# Patient Record
Sex: MALE | Race: WHITE | NOT HISPANIC OR LATINO | Employment: FULL TIME | URBAN - METROPOLITAN AREA
[De-identification: names, ages, dates, MRNs, and addresses within clinical notes are randomized per-mention and may not be internally consistent; named-entity substitution may affect disease eponyms.]

---

## 2017-07-19 ENCOUNTER — GENERIC CONVERSION - ENCOUNTER (OUTPATIENT)
Dept: OTHER | Facility: OTHER | Age: 37
End: 2017-07-19

## 2017-07-20 ENCOUNTER — GENERIC CONVERSION - ENCOUNTER (OUTPATIENT)
Dept: OTHER | Facility: OTHER | Age: 37
End: 2017-07-20

## 2017-07-20 LAB
A/G RATIO (HISTORICAL): 2.5 (ref 1.2–2.2)
ALBUMIN SERPL BCP-MCNC: 4.7 G/DL (ref 3.5–5.5)
ALP SERPL-CCNC: 74 IU/L (ref 39–117)
ALT SERPL W P-5'-P-CCNC: 19 IU/L (ref 0–44)
AST SERPL W P-5'-P-CCNC: 15 IU/L (ref 0–40)
BASOPHILS # BLD AUTO: 0 X10E3/UL (ref 0–0.2)
BASOPHILS # BLD AUTO: 1 %
BILIRUB SERPL-MCNC: 0.7 MG/DL (ref 0–1.2)
BUN SERPL-MCNC: 10 MG/DL (ref 6–20)
BUN/CREA RATIO (HISTORICAL): 14 (ref 9–20)
CALCIUM SERPL-MCNC: 9.4 MG/DL (ref 8.7–10.2)
CHLORIDE SERPL-SCNC: 98 MMOL/L (ref 96–106)
CHOLEST SERPL-MCNC: 150 MG/DL (ref 100–199)
CHOLEST/HDLC SERPL: 3.4 RATIO UNITS (ref 0–5)
CO2 SERPL-SCNC: 26 MMOL/L (ref 18–29)
CREAT SERPL-MCNC: 0.74 MG/DL (ref 0.76–1.27)
DEPRECATED RDW RBC AUTO: 12.6 % (ref 12.3–15.4)
EGFR AFRICAN AMERICAN (HISTORICAL): 137 ML/MIN/1.73
EGFR-AMERICAN CALC (HISTORICAL): 119 ML/MIN/1.73
EOSINOPHIL # BLD AUTO: 0.1 X10E3/UL (ref 0–0.4)
EOSINOPHIL # BLD AUTO: 3 %
GLUCOSE SERPL-MCNC: 99 MG/DL (ref 65–99)
HBA1C MFR BLD HPLC: 5.5 % (ref 4.8–5.6)
HCT VFR BLD AUTO: 42.2 % (ref 37.5–51)
HDLC SERPL-MCNC: 44 MG/DL
HGB BLD-MCNC: 14.7 G/DL (ref 12.6–17.7)
IMM.GRANULOCYTES (CD4/8) (HISTORICAL): 0 %
IMM.GRANULOCYTES (CD4/8) (HISTORICAL): 0 X10E3/UL (ref 0–0.1)
LDLC SERPL CALC-MCNC: 84 MG/DL (ref 0–99)
LYMPHOCYTES # BLD AUTO: 1.6 X10E3/UL (ref 0.7–3.1)
LYMPHOCYTES # BLD AUTO: 44 %
MCH RBC QN AUTO: 31.5 PG (ref 26.6–33)
MCHC RBC AUTO-ENTMCNC: 34.8 G/DL (ref 31.5–35.7)
MCV RBC AUTO: 90 FL (ref 79–97)
MONOCYTES # BLD AUTO: 0.3 X10E3/UL (ref 0.1–0.9)
MONOCYTES (HISTORICAL): 9 %
NEUTROPHILS # BLD AUTO: 1.6 X10E3/UL (ref 1.4–7)
NEUTROPHILS # BLD AUTO: 43 %
PLATELET # BLD AUTO: 263 X10E3/UL (ref 150–379)
POTASSIUM SERPL-SCNC: 4.7 MMOL/L (ref 3.5–5.2)
RBC (HISTORICAL): 4.67 X10E6/UL (ref 4.14–5.8)
SODIUM SERPL-SCNC: 138 MMOL/L (ref 134–144)
TOT. GLOBULIN, SERUM (HISTORICAL): 1.9 G/DL (ref 1.5–4.5)
TOTAL PROTEIN (HISTORICAL): 6.6 G/DL (ref 6–8.5)
TRIGL SERPL-MCNC: 112 MG/DL (ref 0–149)
VLDLC SERPL CALC-MCNC: 22 MG/DL (ref 5–40)
WBC # BLD AUTO: 3.7 X10E3/UL (ref 3.4–10.8)

## 2017-07-21 ENCOUNTER — ALLSCRIPTS OFFICE VISIT (OUTPATIENT)
Dept: OTHER | Facility: OTHER | Age: 37
End: 2017-07-21

## 2017-07-21 LAB
25(OH)D3 SERPL-MCNC: 24.7 NG/ML (ref 30–100)
INTERPRETATION (HISTORICAL): NORMAL
VIT B12 SERPL-MCNC: 340 PG/ML (ref 211–946)

## 2017-09-15 ENCOUNTER — GENERIC CONVERSION - ENCOUNTER (OUTPATIENT)
Dept: OTHER | Facility: OTHER | Age: 37
End: 2017-09-15

## 2017-10-05 ENCOUNTER — ALLSCRIPTS OFFICE VISIT (OUTPATIENT)
Dept: OTHER | Facility: OTHER | Age: 37
End: 2017-10-05

## 2017-10-05 DIAGNOSIS — J01.90 ACUTE SINUSITIS: ICD-10-CM

## 2017-10-06 NOTE — PROGRESS NOTES
Assessment  1  Headache (784 0) (R51)   2  Acute bacterial sinusitis (461 9) (J01 90,B96 89)    Plan  Acute bacterial sinusitis    · LevoFLOXacin 500 MG Oral Tablet (Levaquin); TAKE 1 TABLET DAILY   · SUMAtriptan Succinate 100 MG Oral Tablet (Imitrex); 1 tab PO as needed HA   · * XR SINUSES ROUTINE 3+ VIEWS; Status:Active; Requested OBX:[id number];     Discussion/Summary    Rto prn  Possible side effects of new medications were reviewed with the patient/guardian today  The treatment plan was reviewed with the patient/guardian  The patient/guardian understands and agrees with the treatment plan      Chief Complaint  Patient presents for c o headache and neck pain x 3 weeks  nil/lpn      History of Present Illness  HPI: 39 y o m seen for R sided HAs from neck to behind R eye x 3 wks, has some neck pain as well - chronic neck issues - feeling on and off congested - was Tx with Zpack 3 wks ago - not better, Levaquin was called in - did not fill      Review of Systems    Constitutional: feeling poorly, but-no fever  ENT: nasal discharge, but-no earache,-no sore throat-and-no hoarseness  Respiratory: no complaints of shortness of breath, no wheezing or cough, no dyspnea on exertion, no orthopnea or PND  Gastrointestinal: no complaints of abdominal pain, no constipation, no nausea or vomiting, no diarrhea or bloody stools  Musculoskeletal: as noted in HPI  Integumentary: no complaints of skin rash or lesion, no itching or dry skin, no skin wounds  Neurological: headache, but-as noted in HPI,-no numbness-and-no dizziness  Active Problems  1  Acute bacterial sinusitis (461 9) (J01 90,B96 89)   2  Allergic rhinitis (477 9) (J30 9)   3  Cervicalgia (723 1) (M54 2)   4  Headache (784 0) (R51)   5  Vitamin D deficiency (268 9) (E55 9)    Family History  Mother    1  Family history of hyperlipidemia (V18 19) (Z83 49)   2  Family history of hypertension (V17 49) (Z82 49)  Father    3   Family history of atrial fibrillation (V17 49) (Z82 49)  Grandmother    4  Family history of hypertension (V17 49) (Z82 49)  Maternal Grandmother    5  Family history of chronic obstructive pulmonary disease (V17 6) (Z82 5)   6  Family history of emphysema (V17 6) (Z82 5)   7  Family history of uveitis (V19 19) (Z83 518)    Social History   · Caffeine use (V49 89) (F15 90)   · Consumes a vegan diet (V49 89) (Z78 9)   · Drinks caffeinated tea   · Former smoker (Y85 37) (D81 952)   · Former smoker (V15 82) (B56 594)   · No alcohol use  The social history was reviewed and updated today  Surgical History  1  History of Hernia Repair    Current Meds   1  Probiotic Oral Capsule; Therapy: (Recorded:81Mee1998) to Recorded   2  Vitamin D 2000 UNIT Oral Capsule; take 1 capsule daily; Therapy: (Recorded:24Mdt4215) to Recorded    The medication list was reviewed and updated today  Allergies  1  No Known Drug Allergies  2  No Known Latex Allergies   3  Seasonal    Vitals   Recorded: [id number] 10:21AM   Temperature 98 3 F   Heart Rate 72   Pulse Quality Normal   Respiration Quality Normal   Respiration 16   Systolic 043   Diastolic 80   Height 5 ft 11 in   Weight 153 lb    BMI Calculated 21 34   BSA Calculated 1 88     Physical Exam    Constitutional   General appearance: No acute distress, well appearing and well nourished  acutely ill-and-comfortable  Ears, Nose, Mouth, and Throat   Otoscopic examination: Tympanic membrance translucent with normal light reflex  Canals patent without erythema  Oropharynx: Normal with no erythema, edema, exudate or lesions  Pulmonary   Respiratory effort: No increased work of breathing or signs of respiratory distress  Auscultation of lungs: Clear to auscultation, equal breath sounds bilaterally, no wheezes, no rales, no rhonci  Neurologic   Cranial nerves: Cranial nerves 2-12 intact           Signatures   Electronically signed by : АННА Carpio ; Oct  5 2017 12:44PM EST (Author)

## 2018-01-14 VITALS
BODY MASS INDEX: 21.42 KG/M2 | WEIGHT: 153 LBS | SYSTOLIC BLOOD PRESSURE: 120 MMHG | TEMPERATURE: 98.3 F | HEART RATE: 72 BPM | HEIGHT: 71 IN | RESPIRATION RATE: 16 BRPM | DIASTOLIC BLOOD PRESSURE: 80 MMHG

## 2018-01-17 NOTE — PROGRESS NOTES
Assessment    1  Encounter for preventive health examination (V70 0) (Z00 00)   2  Vitamin D deficiency (268 9) (E55 9)    Discussion/Summary    Rto prn   MVI with Vit D 1000 IU a day  The treatment plan was reviewed with the patient/guardian  The patient/guardian understands and agrees with the treatment plan      Chief Complaint  Patient presents for annual PE  nil/lpn      History of Present Illness  HM, Adult Male: The patient is being seen for a health maintenance evaluation  The last health maintenance visit was 2 year(s) ago  General Health: The patient's health since the last visit is described as good  He has regular dental visits  He complains of vision problems  Vision care includes wearing glasses, having regular eye examinations and an eye examination within the last year  Immunizations status: up to date  Lifestyle:  He consumes a diverse and healthy diet  He does not have any weight concerns  He exercises regularly  He does not use tobacco  The patient is a former cigarette smoker, quit smoking cigarettes: 5 y ago and has never used smokeless tobacco  Tobacco Use Duration: 20 cigarette pack(s) per day and 15 year(s) of cigarette use  He denies alcohol use  He denies drug use  Reproductive health:  the patient is sexually active  He is monogamous with a female partner  Screening:      Review of Systems    Constitutional: No fever or chills, feels well, no tiredness, no recent weight gain or weight loss  Eyes: No complaints of eye pain, no red eyes, no discharge from eyes, no itchy eyes  ENT: no complaints of earache, no hearing loss, no nosebleeds, no nasal discharge, no sore throat, no hoarseness  Cardiovascular: No complaints of slow heart rate, no fast heart rate, no chest pain, no palpitations, no leg claudication, no lower extremity  Respiratory: No complaints of shortness of breath, no wheezing, no cough, no SOB on exertion, no orthopnea or PND     Gastrointestinal: No complaints of abdominal pain, no constipation, no nausea or vomiting, no diarrhea or bloody stools  Genitourinary: No complaints of dysuria, no incontinence, no hesitancy, no nocturia, no genital lesion, no testicular pain  Musculoskeletal: No complaints of arthralgia, no myalgias, no joint swelling or stiffness, no limb pain or swelling  Integumentary: No complaints of skin rash or skin lesions, no itching, no skin wound, no dry skin  Neurological: No compliants of headache, no confusion, no convulsions, no numbness or tingling, no dizziness or fainting, no limb weakness, no difficulty walking  Psychiatric: Is not suicidal, no sleep disturbances, no anxiety or depression, no change in personality, no emotional problems  Endocrine: No complaints of proptosis, no hot flashes, no muscle weakness, no erectile dysfunction, no deepening of the voice, no feelings of weakness  Hematologic/Lymphatic: No complaints of swollen glands, no swollen glands in the neck, does not bleed easily, no easy bruising  Surgical History    · History of Hernia Repair    Family History  Mother    · Family history of hyperlipidemia (V18 19) (Z83 49)   · Family history of hypertension (V17 49) (Z82 49)  Father    · Family history of atrial fibrillation (V17 49) (Z82 49)  Grandmother    · Family history of hypertension (V17 49) (Z82 49)  Maternal Grandmother    · Family history of chronic obstructive pulmonary disease (V17 6) (Z82 5)   · Family history of emphysema (V17 6) (Z82 5)   · Family history of uveitis (V19 19) (Z83 518)    Social History    · Caffeine use (V49 89) (F15 90)   · Consumes a vegan diet (V49 89) (Z78 9)   · Drinks caffeinated tea   · Former smoker (V15 82) (W34 891)   · Former smoker (V15 82) (A85 891)   · No alcohol use    Current Meds   1  No Reported Medications Recorded   2  No Reported Medications Recorded    Allergies    1  No Known Drug Allergies    2  No Known Latex Allergies   3  Seasonal    Vitals   Recorded: 82Awc2294 02:35PM   Temperature 98 6 F   Heart Rate 76   Respiration Quality Normal   Respiration 20   Systolic 466   Diastolic 70   Height 5 ft 11 in   Weight 152 lb    BMI Calculated 21 2   BSA Calculated 1 88     Physical Exam    Constitutional   General appearance: No acute distress, well appearing and well nourished  Head and Face   Head and face: Normal     Eyes   Conjunctiva and lids: No erythema, swelling or discharge  Pupils and irises: Equal, round, reactive to light  Ears, Nose, Mouth, and Throat   Otoscopic examination: Tympanic membranes translucent with normal light reflex  Canals patent without erythema  Oropharynx: Normal with no erythema, edema, exudate or lesions  Neck   Neck: Supple, symmetric, trachea midline, no masses  Thyroid: Normal, no thyromegaly  Pulmonary   Respiratory effort: No increased work of breathing or signs of respiratory distress  Auscultation of lungs: Clear to auscultation  Cardiovascular   Auscultation of heart: Normal rate and rhythm, normal S1 and S2, no murmurs  Examination of extremities for edema and/or varicosities: Normal     Abdomen   Abdomen: Non-tender, no masses  Lymphatic   Palpation of lymph nodes in neck: No lymphadenopathy  Musculoskeletal   Gait and station: Normal     Inspection/palpation of joints, bones, and muscles: Normal     Muscle strength/tone: Normal     Skin   Skin and subcutaneous tissue: Normal without rashes or lesions  Neurologic   Cranial nerves: Cranial nerves 2-12 intact  Coordination: Normal finger to nose and heel to shin      Psychiatric   Judgment and insight: Normal     Mood and affect: Normal        Results/Data  (1) CBC/PLT/DIFF [id number] 07:08AM Usman Stade     Test Name Result Flag Reference   WBC 3 7 x10E3/uL  3 4-10 8   RBC 4 67 x10E6/uL  4 14-5 80   Hemoglobin 14 7 g/dL  12 6-17 7   Hematocrit 42 2 %  37 5-51 0   MCV 90 fL  79-97   MCH 31 5 pg 26 6-33 0   MCHC 34 8 g/dL  31 5-35 7   RDW 12 6 %  12 3-15 4   Platelets 686 W72P3/SD  150-379   Neutrophils 43 %     Lymphs 44 %     Monocytes 9 %     Eos 3 %     Basos 1 %     Neutrophils (Absolute) 1 6 x10E3/uL  1 4-7 0   Lymphs (Absolute) 1 6 x10E3/uL  0 7-3 1   Monocytes(Absolute) 0 3 x10E3/uL  0 1-0 9   Eos (Absolute) 0 1 x10E3/uL  0 0-0 4   Baso (Absolute) 0 0 x10E3/uL  0 0-0 2   Immature Granulocytes 0 %     Immature Grans (Abs) 0 0 x10E3/uL  0 0-0 1     (1) VITAMIN D 25-HYDROXY [id number] 07:08AM Rian Zuñiga     Test Name Result Flag Reference   Vitamin D, 25-Hydroxy 24 7 ng/mL L 30 0-100 0   Vitamin D deficiency has been defined by the [facility] [address] practice guideline as a  level of serum 25-OH vitamin D less than 20 ng/mL (1,2)  The Endocrine Society went on to further define vitamin D  insufficiency as a level between 21 and 29 ng/mL (2)  1  IOM ([place] [employer])  2010  Dietary reference     intakes for calcium and D  [address] [facility]: The     [employer]  2  Irwin MF, Axel ACUÑA, Christ SALMON, et al      Evaluation, treatment, and prevention of vitamin D     deficiency: an Endocrine Society clinical practice     guideline  JCEM  2011 Jul; 96(7):1911-30       (1) COMPREHENSIVE METABOLIC PANEL [id number] 75:78DD Rian Zuñiga     Test Name Result Flag Reference   Glucose, Serum 99 mg/dL  65-99   BUN 10 mg/dL  6-20   Creatinine, Serum 0 74 mg/dL L 0 76-1 27   BUN/Creatinine Ratio 14  9-20   Sodium, Serum 138 mmol/L  134-144   Potassium, Serum 4 7 mmol/L  3 5-5 2   Chloride, Serum 98 mmol/L     Carbon Dioxide, Total 26 mmol/L  18-29   Calcium, Serum 9 4 mg/dL  8 7-10 2   Protein, Total, Serum 6 6 g/dL  6 0-8 5   Albumin, Serum 4 7 g/dL  3 5-5 5   Globulin, Total 1 9 g/dL  1 5-4 5   A/G Ratio 2 5 H 1 2-2 2   Bilirubin, Total 0 7 mg/dL  0 0-1 2   Alkaline Phosphatase, S 74 IU/L     AST (SGOT) 15 IU/L  0-40   ALT (SGPT) 19 IU/L  0-44   eGFR If NonAfricn Am 119 mL/min/1 73  >59   eGFR If Africn Am 137 mL/min/1 73  >59     (1) LIPID PANEL, FASTING [id number] 07:08AM [employer] [facility]     Test Name Result Flag Reference   Cholesterol, Total 150 mg/dL  100-199   Triglycerides 112 mg/dL  0-149   HDL Cholesterol 44 mg/dL  >39   VLDL Cholesterol Toribio 22 mg/dL  5-40   LDL Cholesterol Calc 84 mg/dL  0-99   T  Chol/HDL Ratio 3 4 ratio units  0 0-5 0   T  Chol/HDL Ratio                                                             Men  Women                                               1/2 Avg  Risk  3 4    3 3                                                   Avg Risk  5 0    4 4                                                2X Avg  Risk  9 6    7 1                                                3X Avg  Risk 23 4   11 0     (1) HEMOGLOBIN A1C [id number] 07:08AM [facility]leene Prescott     Test Name Result Flag Reference   Hemoglobin A1c 5 5 %  4 8-5 6   Pre-diabetes: 5 7 - 6 4           Diabetes: >6 4           Glycemic control for adults with diabetes: <7 0     (1) VITAMIN B12 [id number] 07:08AM [facility] [employer]     Test Name Result Flag Reference   Vitamin B12 340 pg/mL  211946       Procedure    Procedure: Visual Acuity Test    Indication: routine screening  Inforrmation supplied by a Snellen chart  Results: 20/15 in both eyes with corrective device, 20/25 in the right eye with corrective device, 20/20 in the left eye with corrective device   Color vision was screened with the Ishihara Test and the results were normal    The patient was cooperative, but tolerated the procedure well        Signatures   Electronically signed by : АННА John ; Jul 21 2017  3:15PM EST                       (Author)

## 2018-01-22 VITALS
DIASTOLIC BLOOD PRESSURE: 65 MMHG | RESPIRATION RATE: 16 BRPM | WEIGHT: 153.13 LBS | SYSTOLIC BLOOD PRESSURE: 100 MMHG | TEMPERATURE: 98.2 F | HEIGHT: 71 IN | BODY MASS INDEX: 21.44 KG/M2 | HEART RATE: 68 BPM

## 2018-01-22 VITALS
DIASTOLIC BLOOD PRESSURE: 70 MMHG | HEIGHT: 71 IN | WEIGHT: 152 LBS | TEMPERATURE: 98.6 F | BODY MASS INDEX: 21.28 KG/M2 | HEART RATE: 76 BPM | RESPIRATION RATE: 20 BRPM | SYSTOLIC BLOOD PRESSURE: 110 MMHG

## 2018-07-18 DIAGNOSIS — Z13.1 SCREENING FOR DIABETES MELLITUS: ICD-10-CM

## 2018-07-18 DIAGNOSIS — Z13.6 SCREENING FOR CARDIOVASCULAR CONDITION: ICD-10-CM

## 2018-07-18 DIAGNOSIS — E55.9 VITAMIN D DEFICIENCY: Primary | ICD-10-CM

## 2018-07-18 DIAGNOSIS — Z78.9 VEGAN: ICD-10-CM

## 2018-07-18 RX ORDER — MULTIVIT-MIN/IRON/FOLIC ACID/K 18-600-40
1 CAPSULE ORAL DAILY
COMMUNITY

## 2018-07-19 DIAGNOSIS — Z13.6 SCREENING FOR CARDIOVASCULAR CONDITION: ICD-10-CM

## 2018-07-19 DIAGNOSIS — Z13.1 SCREENING FOR DIABETES MELLITUS: ICD-10-CM

## 2018-07-19 DIAGNOSIS — Z78.9 VEGAN: Primary | ICD-10-CM

## 2018-08-06 VITALS
WEIGHT: 150 LBS | DIASTOLIC BLOOD PRESSURE: 86 MMHG | SYSTOLIC BLOOD PRESSURE: 129 MMHG | HEART RATE: 61 BPM | BODY MASS INDEX: 21 KG/M2 | HEIGHT: 71 IN

## 2018-08-06 DIAGNOSIS — M76.52 PATELLAR TENDINITIS, LEFT KNEE: Primary | ICD-10-CM

## 2018-08-06 DIAGNOSIS — IMO0001: ICD-10-CM

## 2018-08-06 DIAGNOSIS — M25.562 LEFT KNEE PAIN, UNSPECIFIED CHRONICITY: ICD-10-CM

## 2018-08-06 PROCEDURE — 99203 OFFICE O/P NEW LOW 30 MIN: CPT | Performed by: ORTHOPAEDIC SURGERY

## 2018-08-06 NOTE — PROGRESS NOTES
Assessment/Plan:  1  Patellar tendinitis, left knee     2  Sprain, hamstring, left, initial encounter     3  Left knee pain, unspecified chronicity  XR knee 3 vw left non injury       Scribe Attestation    I,:   Dennehotso Jeremy am acting as a scribe while in the presence of the attending physician :        I,:   Sofia Dover MD personally performed the services described in this documentation    as scribed in my presence :              Lucero Cortes upon examination of his left knee today demonstrates signs and symptoms consistent with a mild hamstring strain, as well as patellar tendinitis  I did discuss with him the that these injuries are commonly associated to each other, and can present at the same time  I would like to treat this conservatively  As he does demonstrate good strength with knee flexion as well as knee extension  I have given Nette 3 home exercises for his hamstrings as well as his quads to strengthen his knee  I discouraged excessive use of pain relievers encouraged the use of ice at night to help with any pain or swelling  This is to be for 15 minutes for each hour as tolerated  I feel that he may continue with activities as tolerated with no restrictions  Lucero Cortes may follow up with me on as-needed basis  Subjective:   Som Jeffery is a 40 y o  male who presents to me today with left posterior anterior knee pain  He states approximately 2 weeks ago he started experience intermittent and mild to moderate sharp pains about the posterior medial and anterior aspect of his knees  He states that he was doing dynamic like swings to stretch his hamstrings  He denies any pain or discomfort at the time of the activity  However did note soreness the following day  He is typically pain-free at rest however pain is exacerbated with activities such as hamstring stretching or running activities  He denies any history of injury to this knee  Today he denies any distal paresthesias        Review of Systems   Constitutional: Negative  HENT: Negative  Eyes: Negative  Respiratory: Negative  Cardiovascular: Negative  Gastrointestinal: Negative  Endocrine: Negative  Genitourinary: Negative  Musculoskeletal: Positive for arthralgias and joint swelling  Skin: Negative  Allergic/Immunologic: Negative  Neurological: Negative  Hematological: Negative  Psychiatric/Behavioral: Negative  Past Medical History:   Diagnosis Date    Cervical disc herniation     6/2/2014 per [employer]       Past Surgical History:   Procedure Laterality Date    HERNIA REPAIR         Family History   Problem Relation Age of Onset    Hyperlipidemia Mother     Hypertension Mother     Atrial fibrillation Father     COPD Maternal Grandmother     Emphysema Maternal Grandmother     Uveitis Maternal Grandmother        Social History     Occupational History    Not on file  Social History Main Topics    Smoking status: Former Smoker    Smokeless tobacco: Never Used    Alcohol use No    Drug use: No    Sexual activity: Not on file         Current Outpatient Prescriptions:     Cholecalciferol (VITAMIN D) 2000 units CAPS, Take 1 capsule by mouth daily, Disp: , Rfl:     No Known Allergies    Objective:  Vitals:    08/06/18 0917   BP: 129/86   Pulse: 61       Left Knee Exam     Tenderness   The patient is experiencing tenderness in the patellar tendon (Medial border of the patella)  Range of Motion   Extension: 0 (65° hip flexion with knee fully extended)   Flexion: 150     Tests   Roly:  Medial - negative Lateral - negative  Drawer:       Anterior - negative     Posterior - negative  Varus: negative  Valgus: negative    Other   Erythema: absent  Scars: absent  Sensation: normal  Pulse: present  Left knee swelling: Swelling noted the patellar tendon      Comments:  Knee extension strength:  5/5  Knee flexion strength:  5/5            Physical Exam   Constitutional: He is oriented to person, place, and time  He appears well-developed and well-nourished  HENT:   Head: Normocephalic and atraumatic  Eyes: Conjunctivae are normal    Neck: Normal range of motion  Cardiovascular: Normal rate  Pulmonary/Chest: Effort normal    Musculoskeletal:   As noted in HPI   Neurological: He is alert and oriented to person, place, and time  Skin: Skin is warm and dry  Psychiatric: He has a normal mood and affect  His behavior is normal  Judgment and thought content normal    Nursing note and vitals reviewed

## 2018-08-06 NOTE — PATIENT INSTRUCTIONS
Knee Exercises   AMBULATORY CARE:   What you need to know about knee exercises:  Knee exercises help strengthen the muscles around your knee  Strong muscles can help reduce pain and decrease your risk of future injury  Knee exercises also help you heal after an injury or surgery  · Start slow  These are beginning exercises  Ask your healthcare provider if you need to see a physical therapist for more advanced exercises  As you get stronger, you may be able to do more sets of each exercise or add weights  · Stop if you feel pain  It is normal to feel some discomfort at first  Regular exercise will help decrease your discomfort over time  · Do the exercises on both legs  Do this so both knees remain strong  · Warm up before you do knee exercises  Walk or ride a stationary bike for 5 or 10 minutes to warm your muscles  How to perform knee stretches safely:  Always stretch before you do strengthening exercises  Do these stretching exercises again after you do the strengthening exercises  Do these stretches 4 or 5 days a week, or as directed  · Standing calf stretch: Face a wall and place both palms flat on the wall, or hold the back of a chair for balance  Keep a slight bend in your knees  Take a big step backward with one leg  Keep your other leg directly under you  Keep both heels flat and press your hips forward  Hold the stretch for 30 seconds, and then relax for 30 seconds  Switch legs  Repeat 2 or 3 times on each leg  · Standing quadriceps stretch:  Stand and place one hand against a wall or hold the back of a chair for balance  With your weight on one leg, bend your other leg and grab your ankle  Bring your heel toward your buttocks  Hold the stretch for 30 to 60 seconds  Switch legs  Repeat 2 or 3 times on each leg  · Sitting hamstring stretch:  Sit with both legs straight in front of you  Do not point or flex your toes   Place your palms on the floor and slide your hands forward until you feel the stretch  Do not round your back  Hold the stretch for 30 seconds  Repeat 2 or 3 times  How to perform knee strengthening exercises safely:  Do these exercises 4 or 5 days a week, or as directed  · Standing half squats:  Stand with your feet shoulder-width apart  Lean your back against a wall or hold the back of a chair for balance, if needed  Slowly sit down about 10 inches, as if you are going to sit in a chair  Your body weight should be mostly over your heels  Hold the squat for 5 seconds, then rise to a standing position  Do 3 sets of 10 squats to strengthen your buttocks and thighs  · Standing hamstring curls: Face a wall and place both palms flat on the wall, or hold the back of a chair for balance  With your weight on one leg, lift your other foot as close to your buttocks as you can  Hold for 5 seconds and then lower your leg  Do 2 sets of 10 curls on each leg  This exercise strengthens the muscles in the back of your thigh  · Standing calf raises:  Face a wall and place both palms flat on the wall, or hold the back of a chair for balance  Stand up straight, and do not lean  Place all your weight on one leg by lifting the other foot off the floor  Raise the heel of the foot that is on the floor as high as you can and then lower it  Do 2 sets of 10 calf raises on each leg to strengthen your calf muscles  · Straight leg lifts:  Lie on your stomach with straight legs  Fold your arms in front of you and rest your head in your arms  Tighten your leg muscles and raise one leg as high as you can  Hold for 5 seconds, then lower your leg  Do 2 sets of 10 lifts on each leg to strengthen your buttocks  · Sitting leg lifts:  Sit in a chair  Slowly straighten and raise one leg  Squeeze your thigh muscles and hold for 5 seconds  Relax and return your foot to the floor  Do 2 sets of 10 lifts on each leg   This helps strengthen the muscles in the front of your thigh  Contact your healthcare provider if:   · You have new pain or your pain becomes worse  · You have questions or concerns about your condition or care  © 2017 [employer] Information is for End User's use only and may not be sold, redistributed or otherwise used for commercial purposes  All illustrations and images included in CareNotes® are the copyrighted property of [employer]  or [employer]  The above information is an  only  It is not intended as medical advice for individual conditions or treatments  Talk to your doctor, nurse or pharmacist before following any medical regimen to see if it is safe and effective for you  Hamstring Exercises   AMBULATORY CARE:   Hamstring exercises  help strengthen and stretch the muscles that support your lower back, hips, and knee  This decreases pain, improves movement, and decreases your risk of future injury  Contact your healthcare provider if:   · You have sharp or worsening pain during exercise or at rest     · You have questions or concern about your condition, care, or exercise program   Exercise safely:   · Move slowly and smoothly  Avoid fast or jerky motions  This will help prevent another injury  · Breathe normally  Do not hold your breath  It is important to breathe in and out so you do not tense up during exercise  Tension could prevent your muscles from stretching  · Do the exercises and stretches on both legs  Do this so the muscles on both legs remain strong and flexible  · Stop if you feel sharp pain or an increase in pain  Stop the exercise and contact your healthcare provider if you have these symptoms  It is normal to feel some discomfort, such as a dull ache, during exercise  Regular exercise will help decrease your discomfort over time  · Warm up before you stretch and exercise  This will help prevent an injury   Walk or ride a stationary bike for 5 to 10 minutes  How to perform stretching exercises:  Ask your healthcare provider how often to do these stretches:  · Hamstring stretch with a towel:  Lie on your back on the floor  Bend both legs so your feet rest flat  Lift one leg off the floor and loop a towel around your foot  Grasp the ends of the towel and slowly straighten your lifted leg  Use the towel to gently pull your leg toward you until you feel the stretch  Keep your leg straight and your foot flexed toward your body  Hold for 30 seconds  Use a longer towel if needed  · Sitting hamstring stretch:  Sit on the floor with both legs straight in front of you  Do not point your toes or flex your feet  Place your palms on the floor and slide your hands forward until you feel the stretch  Keep your back straight and do not lock your knees  Hold the stretch for 30 seconds  · Standing hamstring stretch:  Stand with your feet hips distance apart  Place one leg so it rests on a firm surface, such as a table or chair  Keep your toes pointing up  Slide both hands down the outside of your leg until you feel a stretch  Keep your chest lifted and your back straight  Hold for 30 seconds  · Sitting wide-leg stretch:  Sit on the floor and extend your legs as wide as possible  Keep your legs straight and lean over one leg  Slide your hands forward until you feel a stretch  Keep your chest lifted and your back straight  Hold for 30 seconds  How to perform strengthening exercises:  Always do strengthening exercises after you stretch  As you get stronger your healthcare provider may tell you to you add weights or more repetitions to your strengthening exercises  · Hamstring curls:  Place your hand on a wall or the back of a chair for balance  Place the weight in one of your legs  Lift the other leg and raise your heel toward your buttocks  Hold for 5 seconds  Slowly lower your leg until it is a few inches off the floor  Do 3 sets of 10   Repeat on other side  · Straight leg raise:  Lie on the floor with your face down  Rest your forehead on your folded arms  Keep your body in a straight line  Keep your hip bones on the floor, and tighten the butt and thigh muscles of your injured leg  Keep one leg straight and raise it toward the ceiling as high as you can  Hold for 5 seconds  Slowly return to the starting position  Do 3 sets of 10  Repeat on other side  · Half squats:  Stand with your feet shoulder distance apart  Rest your hands on the front of your thighs or reach them out in front of you  You may hold on to the back of a chair or wall for balance  Keep your chest lifted and lower your hips about 10 inches, as if you are going to sit  Make sure your weight is in your heels and hold for 5 seconds  Keep your weight in your heels and slowly stand  Do 3 sets of 10  Follow up with your healthcare provider as directed:  Write down your questions so you remember to ask them during your visits  © 2017 [employer] Information is for End User's use only and may not be sold, redistributed or otherwise used for commercial purposes  All illustrations and images included in CareNotes® are the copyrighted property of [employer]  or [employer]  The above information is an  only  It is not intended as medical advice for individual conditions or treatments  Talk to your doctor, nurse or pharmacist before following any medical regimen to see if it is safe and effective for you

## 2018-08-08 ENCOUNTER — OFFICE VISIT (OUTPATIENT)
Dept: FAMILY MEDICINE CLINIC | Facility: CLINIC | Age: 38
End: 2018-08-08
Payer: COMMERCIAL

## 2018-08-08 VITALS
HEIGHT: 71 IN | WEIGHT: 151.2 LBS | TEMPERATURE: 98.8 F | RESPIRATION RATE: 16 BRPM | HEART RATE: 84 BPM | BODY MASS INDEX: 21.17 KG/M2 | DIASTOLIC BLOOD PRESSURE: 60 MMHG | SYSTOLIC BLOOD PRESSURE: 110 MMHG

## 2018-08-08 DIAGNOSIS — M25.562 PAIN AND SWELLING OF KNEE, LEFT: ICD-10-CM

## 2018-08-08 DIAGNOSIS — M25.462 PAIN AND SWELLING OF KNEE, LEFT: ICD-10-CM

## 2018-08-08 DIAGNOSIS — Z00.00 ROUTINE MEDICAL EXAM: Primary | ICD-10-CM

## 2018-08-08 PROCEDURE — 99395 PREV VISIT EST AGE 18-39: CPT | Performed by: FAMILY MEDICINE

## 2018-08-08 NOTE — PROGRESS NOTES
Chief Complaint   Patient presents with    Physical Exam        Patient ID: Preeti Chandler is a 40 y o  male  HPI  Pt is seeing for CPE     The following portions of the patient's history were reviewed and updated as appropriate: allergies, current medications, past family history, past medical history, past social history, past surgical history and problem list     Review of Systems   Constitutional: Negative for fatigue, fever and unexpected weight change  HENT: Negative for congestion, ear discharge, ear pain, hearing loss, rhinorrhea, sinus pressure, sore throat and trouble swallowing  Eyes: Negative  Respiratory: Negative  Cardiovascular: Negative  Gastrointestinal: Negative  Endocrine: Negative  Genitourinary: Negative  Musculoskeletal: Positive for arthralgias  Negative for back pain, gait problem, myalgias, neck pain and neck stiffness  L knee swelling and pain x 2 wks    Skin: Negative  Neurological: Negative for dizziness, weakness, light-headedness and numbness  Hematological: Negative  Psychiatric/Behavioral: Negative  Current Outpatient Prescriptions   Medication Sig Dispense Refill    Cholecalciferol (VITAMIN D) 2000 units CAPS Take 1 capsule by mouth daily       No current facility-administered medications for this visit  Objective:    /60 (BP Location: Right arm, Patient Position: Sitting, Cuff Size: Standard)   Pulse 84   Temp 98 8 °F (37 1 °C) (Tympanic)   Resp 16   Ht 5' 11" (1 803 m)   Wt 68 6 kg (151 lb 3 2 oz)   BMI 21 09 kg/m²        Physical Exam   Constitutional: He is oriented to person, place, and time  He appears well-developed and well-nourished  No distress  HENT:   Head: Normocephalic and atraumatic     Right Ear: Hearing, tympanic membrane, external ear and ear canal normal    Left Ear: Hearing, tympanic membrane, external ear and ear canal normal    Mouth/Throat: Oropharynx is clear and moist    Eyes: Conjunctivae and EOM are normal    Neck: Neck supple  No JVD present  No thyroid mass and no thyromegaly present  Cardiovascular: Regular rhythm and normal heart sounds  Exam reveals no gallop  No murmur heard  Pulmonary/Chest: Breath sounds normal  No respiratory distress  He has no wheezes  He has no rhonchi  He has no rales  Abdominal: Soft  Bowel sounds are normal  There is no tenderness  Musculoskeletal: He exhibits no tenderness or deformity  Left knee: He exhibits swelling  He exhibits normal range of motion, no effusion and no erythema  Lymphadenopathy:     He has no cervical adenopathy  Neurological: He is alert and oriented to person, place, and time  He has normal strength  No cranial nerve deficit  Skin: Skin is intact  No rash noted  No pallor  Psychiatric: He has a normal mood and affect  His behavior is normal          Labs in chart were reviewed  Assessment/Plan:         Diagnoses and all orders for this visit:    Routine medical exam    Pain and swelling of knee, left  -     Lyme Antibody Profile with reflex to WB;  Future      rto prdavid Hayes MD

## 2018-08-10 LAB
25(OH)D3 SERPL-MCNC: 31 NG/ML
ALBUMIN SERPL-MCNC: 4.7 G/DL (ref 3.5–5.5)
ALBUMIN/GLOB SERPL: 2.4 {RATIO} (ref 1.2–2.2)
ALP SERPL-CCNC: 80 IU/L (ref 39–117)
ALT SERPL-CCNC: 14 IU/L (ref 0–44)
AST SERPL-CCNC: 13 IU/L (ref 0–40)
BILIRUB SERPL-MCNC: 0.6 MG/DL (ref 0–1.2)
BUN SERPL-MCNC: 8 MG/DL (ref 6–20)
BUN/CREAT SERPL: 9 (ref 9–20)
CALCIUM SERPL-MCNC: 9.6 MG/DL (ref 8.7–10.2)
CHLORIDE SERPL-SCNC: 102 MMOL/L (ref 96–106)
CHOLEST SERPL-MCNC: 148 MG/DL (ref 100–199)
CHOLEST/HDLC SERPL: 3.3 RATIO (ref 0–5)
CO2 SERPL-SCNC: 24 MMOL/L (ref 20–29)
CREAT SERPL-MCNC: 0.85 MG/DL (ref 0.76–1.27)
ERYTHROCYTE [DISTWIDTH] IN BLOOD BY AUTOMATED COUNT: 12.6 % (ref 12.3–15.4)
EST. AVERAGE GLUCOSE BLD GHB EST-MCNC: 103 MG/DL
GLOBULIN SER-MCNC: 2 G/DL (ref 1.5–4.5)
GLUCOSE SERPL-MCNC: 102 MG/DL (ref 65–99)
HBA1C MFR BLD: 5.2 % (ref 4.8–5.6)
HCT VFR BLD AUTO: 41.2 % (ref 37.5–51)
HDLC SERPL-MCNC: 45 MG/DL
HGB BLD-MCNC: 14.3 G/DL (ref 13–17.7)
LDLC SERPL CALC-MCNC: 84 MG/DL (ref 0–99)
MCH RBC QN AUTO: 31.9 PG (ref 26.6–33)
MCHC RBC AUTO-ENTMCNC: 34.7 G/DL (ref 31.5–35.7)
MCV RBC AUTO: 92 FL (ref 79–97)
MICRODELETION SYND BLD/T FISH: NORMAL
PLATELET # BLD AUTO: 264 X10E3/UL (ref 150–379)
POTASSIUM SERPL-SCNC: 4.3 MMOL/L (ref 3.5–5.2)
PROT SERPL-MCNC: 6.7 G/DL (ref 6–8.5)
RBC # BLD AUTO: 4.48 X10E6/UL (ref 4.14–5.8)
SL AMB EGFR AFRICAN AMERICAN: 129 ML/MIN/1.73
SL AMB EGFR NON AFRICAN AMERICAN: 111 ML/MIN/1.73
SL AMB VLDL CHOLESTEROL CALC: 19 MG/DL (ref 5–40)
SODIUM SERPL-SCNC: 143 MMOL/L (ref 134–144)
TRIGL SERPL-MCNC: 97 MG/DL (ref 0–149)
VIT B12 SERPL-MCNC: 262 PG/ML (ref 232–1245)
WBC # BLD AUTO: 4.1 X10E3/UL (ref 3.4–10.8)

## 2018-08-16 LAB
B BURGDOR IGG+IGM SER-ACNC: <0.91 ISR (ref 0–0.9)
LABCORP COMMENT: NORMAL

## 2019-05-22 ENCOUNTER — OFFICE VISIT (OUTPATIENT)
Dept: RHEUMATOLOGY | Facility: CLINIC | Age: 39
End: 2019-05-22
Payer: COMMERCIAL

## 2019-05-22 ENCOUNTER — APPOINTMENT (OUTPATIENT)
Dept: RADIOLOGY | Facility: CLINIC | Age: 39
End: 2019-05-22
Payer: COMMERCIAL

## 2019-05-22 VITALS
SYSTOLIC BLOOD PRESSURE: 113 MMHG | WEIGHT: 154 LBS | DIASTOLIC BLOOD PRESSURE: 79 MMHG | BODY MASS INDEX: 21.56 KG/M2 | HEIGHT: 71 IN | HEART RATE: 78 BPM

## 2019-05-22 DIAGNOSIS — M79.642 BILATERAL HAND PAIN: ICD-10-CM

## 2019-05-22 DIAGNOSIS — R21 SKIN RASH: ICD-10-CM

## 2019-05-22 DIAGNOSIS — G89.29 CHRONIC PAIN OF BOTH KNEES: ICD-10-CM

## 2019-05-22 DIAGNOSIS — M54.50 CHRONIC MIDLINE LOW BACK PAIN WITHOUT SCIATICA: ICD-10-CM

## 2019-05-22 DIAGNOSIS — M79.641 BILATERAL HAND PAIN: ICD-10-CM

## 2019-05-22 DIAGNOSIS — M25.561 CHRONIC PAIN OF BOTH KNEES: ICD-10-CM

## 2019-05-22 DIAGNOSIS — G89.29 CHRONIC MIDLINE LOW BACK PAIN WITHOUT SCIATICA: ICD-10-CM

## 2019-05-22 DIAGNOSIS — M25.562 CHRONIC PAIN OF BOTH KNEES: Primary | ICD-10-CM

## 2019-05-22 DIAGNOSIS — G89.29 CHRONIC PAIN OF BOTH KNEES: Primary | ICD-10-CM

## 2019-05-22 DIAGNOSIS — M25.561 CHRONIC PAIN OF BOTH KNEES: Primary | ICD-10-CM

## 2019-05-22 DIAGNOSIS — M25.562 CHRONIC PAIN OF BOTH KNEES: ICD-10-CM

## 2019-05-22 PROCEDURE — 73130 X-RAY EXAM OF HAND: CPT

## 2019-05-22 PROCEDURE — 73562 X-RAY EXAM OF KNEE 3: CPT

## 2019-05-22 PROCEDURE — 72202 X-RAY EXAM SI JOINTS 3/> VWS: CPT

## 2019-05-22 PROCEDURE — 99204 OFFICE O/P NEW MOD 45 MIN: CPT | Performed by: INTERNAL MEDICINE

## 2019-05-22 RX ORDER — DIPHENOXYLATE HYDROCHLORIDE AND ATROPINE SULFATE 2.5; .025 MG/1; MG/1
1 TABLET ORAL DAILY
COMMUNITY

## 2019-05-31 ENCOUNTER — TELEPHONE (OUTPATIENT)
Dept: OBGYN CLINIC | Facility: CLINIC | Age: 39
End: 2019-05-31

## 2019-05-31 LAB
ALBUMIN SERPL-MCNC: 4.8 G/DL (ref 3.5–5.5)
ALBUMIN/GLOB SERPL: 2.5 {RATIO} (ref 1.2–2.2)
ALP SERPL-CCNC: 66 IU/L (ref 39–117)
ALT SERPL-CCNC: 14 IU/L (ref 0–44)
AST SERPL-CCNC: 12 IU/L (ref 0–40)
BASOPHILS # BLD AUTO: 0 X10E3/UL (ref 0–0.2)
BASOPHILS NFR BLD AUTO: 1 %
BILIRUB SERPL-MCNC: 0.7 MG/DL (ref 0–1.2)
BUN SERPL-MCNC: 8 MG/DL (ref 6–20)
BUN/CREAT SERPL: 11 (ref 9–20)
CALCIUM SERPL-MCNC: 9.6 MG/DL (ref 8.7–10.2)
CCP IGA+IGG SERPL IA-ACNC: 3 UNITS (ref 0–19)
CHLORIDE SERPL-SCNC: 101 MMOL/L (ref 96–106)
CO2 SERPL-SCNC: 24 MMOL/L (ref 20–29)
CREAT SERPL-MCNC: 0.74 MG/DL (ref 0.76–1.27)
CRP SERPL-MCNC: <0.3 MG/L (ref 0–4.9)
EOSINOPHIL # BLD AUTO: 0 X10E3/UL (ref 0–0.4)
EOSINOPHIL NFR BLD AUTO: 1 %
ERYTHROCYTE [DISTWIDTH] IN BLOOD BY AUTOMATED COUNT: 12.2 % (ref 12.3–15.4)
ERYTHROCYTE [SEDIMENTATION RATE] IN BLOOD BY WESTERGREN METHOD: 2 MM/HR (ref 0–15)
GLOBULIN SER-MCNC: 1.9 G/DL (ref 1.5–4.5)
GLUCOSE SERPL-MCNC: 90 MG/DL (ref 65–99)
HAV IGM SERPL QL IA: NEGATIVE
HBV CORE IGM SERPL QL IA: NEGATIVE
HBV SURFACE AG SERPL QL IA: NEGATIVE
HCT VFR BLD AUTO: 37.1 % (ref 37.5–51)
HCV AB S/CO SERPL IA: <0.1 S/CO RATIO (ref 0–0.9)
HGB BLD-MCNC: 13.3 G/DL (ref 13–17.7)
HLA-B27 QL NAA+PROBE: NEGATIVE
IMM GRANULOCYTES # BLD: 0 X10E3/UL (ref 0–0.1)
IMM GRANULOCYTES NFR BLD: 0 %
LABCORP COMMENT: NORMAL
LYMPHOCYTES # BLD AUTO: 1.3 X10E3/UL (ref 0.7–3.1)
LYMPHOCYTES NFR BLD AUTO: 43 %
MCH RBC QN AUTO: 32.2 PG (ref 26.6–33)
MCHC RBC AUTO-ENTMCNC: 35.8 G/DL (ref 31.5–35.7)
MCV RBC AUTO: 90 FL (ref 79–97)
MONOCYTES # BLD AUTO: 0.2 X10E3/UL (ref 0.1–0.9)
MONOCYTES NFR BLD AUTO: 8 %
NEUTROPHILS # BLD AUTO: 1.4 X10E3/UL (ref 1.4–7)
NEUTROPHILS NFR BLD AUTO: 47 %
PLATELET # BLD AUTO: 225 X10E3/UL (ref 150–450)
POTASSIUM SERPL-SCNC: 4.1 MMOL/L (ref 3.5–5.2)
PROT SERPL-MCNC: 6.7 G/DL (ref 6–8.5)
RBC # BLD AUTO: 4.13 X10E6/UL (ref 4.14–5.8)
RHEUMATOID FACT SERPL-ACNC: <10 IU/ML (ref 0–13.9)
SL AMB EGFR AFRICAN AMERICAN: 135 ML/MIN/1.73
SL AMB EGFR NON AFRICAN AMERICAN: 117 ML/MIN/1.73
SODIUM SERPL-SCNC: 142 MMOL/L (ref 134–144)
URATE SERPL-MCNC: 6.2 MG/DL (ref 3.7–8.6)
WBC # BLD AUTO: 3 X10E3/UL (ref 3.4–10.8)

## 2019-06-19 DIAGNOSIS — Z78.9 VEGAN: ICD-10-CM

## 2019-06-19 DIAGNOSIS — D72.819 LEUKOPENIA, UNSPECIFIED TYPE: ICD-10-CM

## 2019-06-19 DIAGNOSIS — E55.9 VITAMIN D DEFICIENCY: Primary | ICD-10-CM

## 2019-06-25 LAB
25(OH)D3+25(OH)D2 SERPL-MCNC: 40.3 NG/ML (ref 30–100)
BASOPHILS # BLD AUTO: 0.1 X10E3/UL (ref 0–0.2)
BASOPHILS NFR BLD AUTO: 1 %
EOSINOPHIL # BLD AUTO: 0.1 X10E3/UL (ref 0–0.4)
EOSINOPHIL NFR BLD AUTO: 2 %
ERYTHROCYTE [DISTWIDTH] IN BLOOD BY AUTOMATED COUNT: 12.7 % (ref 12.3–15.4)
HCT VFR BLD AUTO: 39.6 % (ref 37.5–51)
HGB BLD-MCNC: 13.5 G/DL (ref 13–17.7)
IMM GRANULOCYTES # BLD: 0 X10E3/UL (ref 0–0.1)
IMM GRANULOCYTES NFR BLD: 0 %
LABCORP COMMENT: NORMAL
LYMPHOCYTES # BLD AUTO: 1.7 X10E3/UL (ref 0.7–3.1)
LYMPHOCYTES NFR BLD AUTO: 46 %
MCH RBC QN AUTO: 31.8 PG (ref 26.6–33)
MCHC RBC AUTO-ENTMCNC: 34.1 G/DL (ref 31.5–35.7)
MCV RBC AUTO: 93 FL (ref 79–97)
MONOCYTES # BLD AUTO: 0.3 X10E3/UL (ref 0.1–0.9)
MONOCYTES NFR BLD AUTO: 7 %
NEUTROPHILS # BLD AUTO: 1.6 X10E3/UL (ref 1.4–7)
NEUTROPHILS NFR BLD AUTO: 44 %
PLATELET # BLD AUTO: 249 X10E3/UL (ref 150–450)
RBC # BLD AUTO: 4.24 X10E6/UL (ref 4.14–5.8)
VIT B12 SERPL-MCNC: 364 PG/ML (ref 232–1245)
WBC # BLD AUTO: 3.6 X10E3/UL (ref 3.4–10.8)

## 2019-12-21 ENCOUNTER — DOCUMENTATION (OUTPATIENT)
Dept: FAMILY MEDICINE CLINIC | Facility: CLINIC | Age: 39
End: 2019-12-21

## 2019-12-21 DIAGNOSIS — S99.919A ANKLE INJURY, INITIAL ENCOUNTER: Primary | ICD-10-CM

## 2019-12-22 ENCOUNTER — APPOINTMENT (OUTPATIENT)
Dept: RADIOLOGY | Facility: CLINIC | Age: 39
End: 2019-12-22
Payer: COMMERCIAL

## 2019-12-22 DIAGNOSIS — S99.919A ANKLE INJURY, INITIAL ENCOUNTER: ICD-10-CM

## 2019-12-22 PROCEDURE — 73610 X-RAY EXAM OF ANKLE: CPT

## 2019-12-23 ENCOUNTER — OFFICE VISIT (OUTPATIENT)
Dept: FAMILY MEDICINE CLINIC | Facility: CLINIC | Age: 39
End: 2019-12-23
Payer: COMMERCIAL

## 2019-12-23 VITALS
DIASTOLIC BLOOD PRESSURE: 80 MMHG | BODY MASS INDEX: 21.14 KG/M2 | HEIGHT: 71 IN | SYSTOLIC BLOOD PRESSURE: 118 MMHG | HEART RATE: 68 BPM | OXYGEN SATURATION: 98 % | WEIGHT: 151 LBS

## 2019-12-23 DIAGNOSIS — S93.492A SPRAIN OF OTHER LIGAMENT OF LEFT ANKLE, INITIAL ENCOUNTER: Primary | ICD-10-CM

## 2019-12-23 PROCEDURE — 3008F BODY MASS INDEX DOCD: CPT | Performed by: NURSE PRACTITIONER

## 2019-12-23 PROCEDURE — 1036F TOBACCO NON-USER: CPT | Performed by: NURSE PRACTITIONER

## 2019-12-23 PROCEDURE — 99213 OFFICE O/P EST LOW 20 MIN: CPT | Performed by: NURSE PRACTITIONER

## 2019-12-23 NOTE — LETTER
December 23, 2019     Patient: Daniel Tellez   YOB: 1980   Date of Visit: 12/23/2019       To Whom it May Concern:    [place] Trena is under my professional care  He was seen in my office on 12/23/2019  He may return to work on 12/30/19  If you have any questions or concerns, please don't hesitate to call           Sincerely,          DWAYNE Brown        CC: No Recipients

## 2019-12-23 NOTE — PROGRESS NOTES
Assessment:      Ankle sprain      Plan:      Natural history and expected course discussed  Questions answered  Rest, ice, compression, elevation (RICE) therapy  Educational materials distributed  OTC analgesics as needed  Follow-up with PCP in 2 weeks  get neoprine ankle brace as discussed       Subjective:      Leonel Stokes is a 44 y o  male who presents with left ankle pain  Onset of the symptoms was 2 days ago  Inciting event: rolled ankle when stepped off curb  Current symptoms include ability to bear weight, but with some pain, bruising and swelling  Aggravating symptoms: any weight bearing  Patient's overall course: symptoms have progressed to a point and plateaued and gradually improving  Patient has had prior ankle problems  Previous visits for this problem: none  Evaluation to date: plain films: normal  Treatment to date: none  The following portions of the patient's history were reviewed and updated as appropriate: allergies, current medications, past family history, past medical history, past social history, past surgical history and problem list     Review of Systems  Pertinent items are noted in HPI        Objective:      /80 (BP Location: Left arm, Patient Position: Sitting, Cuff Size: Standard)   Pulse 68   Ht 5' 11" (1 803 m)   Wt 68 5 kg (151 lb)   SpO2 98%   BMI 21 06 kg/m²   Right ankle:  normal   Left ankle:  ecchymosis noted (minor) lateral aspect of midfoot  negative findings: no ligamentous laxity and full range of motion  positive findings: tenderness over lateral malleolus on the left and localized swelling   + tenderness over the lateral malleolus     Imaging:  X-ray: left ankle ,negative except soft tissue swelling

## 2020-01-14 DIAGNOSIS — S33.5XXA SPRAIN OF LOW BACK, INITIAL ENCOUNTER: Primary | ICD-10-CM

## 2020-01-14 RX ORDER — CYCLOBENZAPRINE HCL 5 MG
5 TABLET ORAL 3 TIMES DAILY PRN
Qty: 30 TABLET | Refills: 1 | Status: SHIPPED | OUTPATIENT
Start: 2020-01-14 | End: 2021-01-20

## 2021-01-20 ENCOUNTER — OFFICE VISIT (OUTPATIENT)
Dept: FAMILY MEDICINE CLINIC | Facility: CLINIC | Age: 41
End: 2021-01-20
Payer: COMMERCIAL

## 2021-01-20 VITALS
WEIGHT: 157 LBS | TEMPERATURE: 97.8 F | BODY MASS INDEX: 21.98 KG/M2 | RESPIRATION RATE: 16 BRPM | DIASTOLIC BLOOD PRESSURE: 70 MMHG | HEART RATE: 68 BPM | HEIGHT: 71 IN | SYSTOLIC BLOOD PRESSURE: 110 MMHG

## 2021-01-20 DIAGNOSIS — Z78.9 VEGAN: ICD-10-CM

## 2021-01-20 DIAGNOSIS — K52.9 CHRONIC DIARRHEA: ICD-10-CM

## 2021-01-20 DIAGNOSIS — Z13.1 SCREENING FOR DIABETES MELLITUS: ICD-10-CM

## 2021-01-20 DIAGNOSIS — M25.50 ARTHRALGIA, UNSPECIFIED JOINT: ICD-10-CM

## 2021-01-20 DIAGNOSIS — Z00.00 ROUTINE MEDICAL EXAM: Primary | ICD-10-CM

## 2021-01-20 DIAGNOSIS — Z13.6 SCREENING FOR CARDIOVASCULAR CONDITION: ICD-10-CM

## 2021-01-20 DIAGNOSIS — E55.9 VITAMIN D DEFICIENCY: ICD-10-CM

## 2021-01-20 DIAGNOSIS — R35.1 NOCTURIA: ICD-10-CM

## 2021-01-20 PROCEDURE — 1036F TOBACCO NON-USER: CPT | Performed by: FAMILY MEDICINE

## 2021-01-20 PROCEDURE — 3725F SCREEN DEPRESSION PERFORMED: CPT | Performed by: FAMILY MEDICINE

## 2021-01-20 PROCEDURE — 99396 PREV VISIT EST AGE 40-64: CPT | Performed by: FAMILY MEDICINE

## 2021-01-20 PROCEDURE — 3008F BODY MASS INDEX DOCD: CPT | Performed by: FAMILY MEDICINE

## 2021-01-20 RX ORDER — LANOLIN ALCOHOL/MO/W.PET/CERES
CREAM (GRAM) TOPICAL DAILY
COMMUNITY

## 2021-01-20 NOTE — PROGRESS NOTES
Chief Complaint   Patient presents with    Physical Exam        Patient ID: Ele Menjivar is a 36 y o  male  HPI  Pt is seeing for CPE     The following portions of the patient's history were reviewed and updated as appropriate: allergies, current medications, past family history, past medical history, past social history, past surgical history and problem list     Review of Systems   Constitutional: Negative for fatigue, fever and unexpected weight change  HENT: Negative for congestion, ear discharge, ear pain, hearing loss, rhinorrhea, sinus pressure, sore throat and trouble swallowing  Eyes: Negative  Respiratory: Negative  Cardiovascular: Negative  Gastrointestinal: Positive for diarrhea (chronic  -  normal colonoscopy )  Negative for abdominal distention, abdominal pain, blood in stool, constipation, nausea, rectal pain and vomiting  Endocrine: Negative  Genitourinary: Negative for dysuria, flank pain, frequency, hematuria, penile pain and urgency  + nocturia ( 2-3 times at night )     Musculoskeletal: Positive for arthralgias (multiple joints for amny years -  was seen by rheumatologist 1 5 y ago -  initial labs are negative  -  not better  -  NSAID did not help ) and joint swelling  Negative for back pain, gait problem, myalgias, neck pain and neck stiffness  Skin: Negative  Neurological: Negative for dizziness, weakness, light-headedness and numbness  Hematological: Negative  Psychiatric/Behavioral: Negative  Current Outpatient Medications   Medication Sig Dispense Refill    Cholecalciferol (VITAMIN D) 2000 units CAPS Take 1 capsule by mouth daily      multivitamin (THERAGRAN) TABS Take 1 tablet by mouth daily      vitamin B-12 (VITAMIN B-12) 1,000 mcg tablet Take by mouth daily       No current facility-administered medications for this visit          Objective:    /70 (BP Location: Right arm, Patient Position: Sitting, Cuff Size: Standard)   Pulse 68 Temp 97 8 °F (36 6 °C) (Tympanic)   Resp 16   Ht 5' 11" (1 803 m)   Wt 71 2 kg (157 lb)   BMI 21 90 kg/m²        Physical Exam  Constitutional:       General: He is not in acute distress  Appearance: Normal appearance  He is well-developed  He is not ill-appearing  HENT:      Head: Normocephalic and atraumatic  Right Ear: Hearing, tympanic membrane, ear canal and external ear normal       Left Ear: Hearing, tympanic membrane, ear canal and external ear normal       Mouth/Throat:      Pharynx: No oropharyngeal exudate or posterior oropharyngeal erythema  Eyes:      Extraocular Movements: Extraocular movements intact  Conjunctiva/sclera: Conjunctivae normal    Neck:      Musculoskeletal: Neck supple  Thyroid: No thyroid mass or thyromegaly  Vascular: No JVD  Cardiovascular:      Rate and Rhythm: Normal rate and regular rhythm  Heart sounds: Normal heart sounds  No murmur  No gallop  Pulmonary:      Effort: Pulmonary effort is normal  No respiratory distress  Breath sounds: Normal breath sounds  No wheezing, rhonchi or rales  Abdominal:      General: Bowel sounds are normal       Palpations: Abdomen is soft  There is no mass  Tenderness: There is no abdominal tenderness  There is no guarding  Musculoskeletal:         General: No swelling or tenderness  Right lower leg: No edema  Left lower leg: No edema  Lymphadenopathy:      Cervical: No cervical adenopathy  Skin:     Findings: No rash  Neurological:      General: No focal deficit present  Mental Status: He is alert and oriented to person, place, and time  Cranial Nerves: No cranial nerve deficit  Psychiatric:         Mood and Affect: Mood normal          Behavior: Behavior normal          Thought Content: Thought content normal          Judgment: Judgment normal            Labs in chart were reviewed        Assessment/Plan:         Diagnoses and all orders for this visit:    Routine medical exam  -     Hemoglobin A1C; Future    Vegan  -     Comprehensive metabolic panel; Future  -     CBC; Future  -     Vitamin D 25 hydroxy; Future  -     Vitamin B12; Future    Screening for diabetes mellitus  -     Hemoglobin A1C; Future    Screening for cardiovascular condition  -     Lipid panel; Future    Vitamin D deficiency  -     Vitamin D 25 hydroxy; Future    Arthralgia, unspecified joint  -     Stool Enteric Bacterial Panel by PCR; Future  -     Urine culture; Future  -     Human Immunodeficiency Virus 1/2 Antigen / Antibody ( Fourth Generation) with Reflex Testing; Future  -     UA (URINE) with reflex to Scope  -     Lyme Antibody Profile with reflex to WB; Future  -     Comprehensive metabolic panel; Future  -     TSH, 3rd generation; Future  -     Chlamydia Antobodies, IGG; Future  -     Sedimentation rate, automated; Future  -     C-reactive protein; Future  -     Rheumatoid Arthritis Factor; Future  -     CHITRA w/Reflex if Positive; Future    Nocturia  -     Urine culture; Future  -     UA (URINE) with reflex to Scope  -     Ambulatory referral to Urology; Future    Chronic diarrhea  -     Clostridium difficile toxin by PCR; Future    Other orders  -     vitamin B-12 (VITAMIN B-12) 1,000 mcg tablet;  Take by mouth daily      will consider second rheumatological  opinion this time depends on labs results   Declined flu vaccine     rto nettie Dave MD

## 2021-01-25 LAB

## 2021-01-26 LAB
25(OH)D3+25(OH)D2 SERPL-MCNC: 21 NG/ML (ref 30–100)
ALBUMIN SERPL-MCNC: 4.7 G/DL (ref 4–5)
ALBUMIN/GLOB SERPL: 2.6 {RATIO} (ref 1.2–2.2)
ALP SERPL-CCNC: 80 IU/L (ref 39–117)
ALT SERPL-CCNC: 17 IU/L (ref 0–44)
ANA TITR SER IF: NEGATIVE {TITER}
AST SERPL-CCNC: 14 IU/L (ref 0–40)
B BURGDOR IGG+IGM SER-ACNC: <0.91 ISR (ref 0–0.9)
B BURGDOR IGM SER IA-ACNC: <0.8 INDEX (ref 0–0.79)
BACTERIA UR CULT: NORMAL
BASOPHILS # BLD AUTO: 0 X10E3/UL (ref 0–0.2)
BASOPHILS NFR BLD AUTO: 1 %
BILIRUB SERPL-MCNC: 0.3 MG/DL (ref 0–1.2)
BUN SERPL-MCNC: 13 MG/DL (ref 6–24)
BUN/CREAT SERPL: 18 (ref 9–20)
C PNEUM IGA+IGG+IGM SER-IMP: ABNORMAL
C PNEUM IGG TITR SER IF: ABNORMAL {TITER}
CALCIUM SERPL-MCNC: 9.3 MG/DL (ref 8.7–10.2)
CHLORIDE SERPL-SCNC: 101 MMOL/L (ref 96–106)
CHOLEST SERPL-MCNC: 151 MG/DL (ref 100–199)
CO2 SERPL-SCNC: 26 MMOL/L (ref 20–29)
CREAT SERPL-MCNC: 0.73 MG/DL (ref 0.76–1.27)
CRP SERPL-MCNC: <1 MG/L (ref 0–10)
EOSINOPHIL # BLD AUTO: 0.1 X10E3/UL (ref 0–0.4)
EOSINOPHIL NFR BLD AUTO: 4 %
ERYTHROCYTE [DISTWIDTH] IN BLOOD BY AUTOMATED COUNT: 11.7 % (ref 11.6–15.4)
ERYTHROCYTE [SEDIMENTATION RATE] IN BLOOD BY WESTERGREN METHOD: 2 MM/HR (ref 0–15)
GLOBULIN SER-MCNC: 1.8 G/DL (ref 1.5–4.5)
GLUCOSE SERPL-MCNC: 98 MG/DL (ref 65–99)
HBA1C MFR BLD: 5.2 % (ref 4.8–5.6)
HCT VFR BLD AUTO: 41.2 % (ref 37.5–51)
HDLC SERPL-MCNC: 41 MG/DL
HGB BLD-MCNC: 14 G/DL (ref 13–17.7)
HIV 1+2 AB+HIV1 P24 AG SERPL QL IA: NON REACTIVE
IMM GRANULOCYTES # BLD: 0 X10E3/UL (ref 0–0.1)
IMM GRANULOCYTES NFR BLD: 0 %
LDLC SERPL CALC-MCNC: 95 MG/DL (ref 0–99)
LYMPHOCYTES # BLD AUTO: 1 X10E3/UL (ref 0.7–3.1)
LYMPHOCYTES NFR BLD AUTO: 35 %
Lab: NO GROWTH
MCH RBC QN AUTO: 32 PG (ref 26.6–33)
MCHC RBC AUTO-ENTMCNC: 34 G/DL (ref 31.5–35.7)
MCV RBC AUTO: 94 FL (ref 79–97)
MICRODELETION SYND BLD/T FISH: NORMAL
MONOCYTES # BLD AUTO: 0.3 X10E3/UL (ref 0.1–0.9)
MONOCYTES NFR BLD AUTO: 12 %
NEUTROPHILS # BLD AUTO: 1.3 X10E3/UL (ref 1.4–7)
NEUTROPHILS NFR BLD AUTO: 48 %
PLATELET # BLD AUTO: 236 X10E3/UL (ref 150–450)
POTASSIUM SERPL-SCNC: 4.2 MMOL/L (ref 3.5–5.2)
PROT SERPL-MCNC: 6.5 G/DL (ref 6–8.5)
RBC # BLD AUTO: 4.38 X10E6/UL (ref 4.14–5.8)
RHEUMATOID FACT SERPL-ACNC: <10 IU/ML (ref 0–13.9)
SL AMB EGFR AFRICAN AMERICAN: 134 ML/MIN/1.73
SL AMB EGFR NON AFRICAN AMERICAN: 116 ML/MIN/1.73
SL AMB VLDL CHOLESTEROL CALC: 15 MG/DL (ref 5–40)
SODIUM SERPL-SCNC: 139 MMOL/L (ref 134–144)
TRIGL SERPL-MCNC: 78 MG/DL (ref 0–149)
TSH SERPL DL<=0.005 MIU/L-ACNC: 1.48 UIU/ML (ref 0.45–4.5)
VIT B12 SERPL-MCNC: 249 PG/ML (ref 232–1245)
WBC # BLD AUTO: 2.8 X10E3/UL (ref 3.4–10.8)

## 2021-01-27 DIAGNOSIS — D72.819 LEUKOPENIA, UNSPECIFIED TYPE: Primary | ICD-10-CM

## 2021-01-28 ENCOUNTER — TELEPHONE (OUTPATIENT)
Dept: HEMATOLOGY ONCOLOGY | Facility: CLINIC | Age: 41
End: 2021-01-28

## 2021-01-28 NOTE — TELEPHONE ENCOUNTER
New Patient Encounter    New Patient Intake Form   Patient Details:  Lady Friend  1980  [id number]    Background Information:  [address] starts by opening a telephone encounter and gathering the following information   Who is calling to schedule? If not self, relationship to patient? Wife - Irish Monsivais   Referring Provider Dr Lianne Riley   What is the diagnosis? leukopenia   Is this diagnosis confirmed? Yes   When was the diagnosis? 01/2021   Is there a confirmed diagnosis from a biopsy/tissue reviewed by pathology? n/a   Were outside slides requested? n/a   Is patient aware of diagnosis? yes   Is there a personal history and what kind? no   Is there a family history and what kind? Not that she is aware of   Reason for visit? New Diagnosis   Have you had any imaging or labs done? If so: when, where? yes  Labs at [address] [place] [address] in [place] [address]? yes   If patient has a prior history of breast cancer were old records obtained? NA   Was the patient told to bring a disk? no   Does the patient smoke or Vape? no   If yes, how many packs or cartridges per day? Former smoker quit 10 years ago   Scheduling Information:   Preferred [place]: [place]     Are there any dates/time the patient cannot be seen? no   Miscellaneous: n/a   After completing the above information, please route to Financial Counselor and the appropriate Nurse Navigator for review

## 2021-02-03 LAB
C PNEUM IGA+IGG+IGM SER-IMP: ABNORMAL
C PNEUM IGG TITR SER IF: ABNORMAL {TITER}
C PNEUM IGM TITR SER IF: ABNORMAL {TITER}
C PSITTACI IGG TITR SER IF: ABNORMAL {TITER}
C PSITTACI IGM TITR SER IF: ABNORMAL {TITER}
C TRACH IGM TITR SER IF: <0.8 INDEX (ref 0–0.7)
CHLAMYDIA IGG SER-ACNC: 1.09 RATIO (ref 0–0.9)

## 2021-02-05 DIAGNOSIS — M25.50 ARTHRALGIA, UNSPECIFIED JOINT: Primary | ICD-10-CM

## 2021-02-05 RX ORDER — DOXYCYCLINE HYCLATE 100 MG/1
100 CAPSULE ORAL EVERY 12 HOURS SCHEDULED
Qty: 60 CAPSULE | Refills: 0 | Status: SHIPPED | OUTPATIENT
Start: 2021-02-05 | End: 2021-03-07

## 2021-02-16 ENCOUNTER — TELEPHONE (OUTPATIENT)
Dept: SURGICAL ONCOLOGY | Facility: CLINIC | Age: 41
End: 2021-02-16

## 2021-02-16 ENCOUNTER — TELEPHONE (OUTPATIENT)
Dept: HEMATOLOGY ONCOLOGY | Facility: CLINIC | Age: 41
End: 2021-02-16

## 2021-02-16 ENCOUNTER — TELEMEDICINE (OUTPATIENT)
Dept: HEMATOLOGY ONCOLOGY | Facility: CLINIC | Age: 41
End: 2021-02-16
Payer: COMMERCIAL

## 2021-02-16 DIAGNOSIS — D72.819 LEUKOPENIA, UNSPECIFIED TYPE: Primary | ICD-10-CM

## 2021-02-16 PROCEDURE — 99245 OFF/OP CONSLTJ NEW/EST HI 55: CPT | Performed by: INTERNAL MEDICINE

## 2021-02-16 NOTE — PROGRESS NOTES
Virtual Regular Visit      Assessment/Plan: This is a 71-year-old male was referred to see us for a low white count  He is Vegan  Most recent white count was 2 8 with a hemoglobin of 14 and a platelet count of 103  ANC was slightly low at 1 3  This was on January 21st   Prior to this in June of 2019 white count was normal   In May of 2019 it was slightly low at 3 0  Overall the patient is asymptomatic from this and denies any frequent infections  He has all sorts of other complaints including GI complaints, diarrhea and joint pains  I will order flow cytometry  I will order an MMA along with iron studies  I suspect this may be nutritional   If it persists or gets worse or the other counts get affected we will consider bone marrow biopsy  For now I will order blood work and see him back in 6 weeks with results  Until then if he has any questions he will call our office  I did offer him a bone marrow biopsy but also explained I think this will be low yield at this point  The patient understands and agrees  I will see him back with results of his MMA and other studies  ESR is normal   Rheumatoid arthritis factor was negative  B12 level is  249 with low normal being 232  I suspect he may have a nutritional deficiency causing some of his symptoms  I will also refer him to see our colleagues in Gastroenterology to have a workup for malabsorption syndrome and possibly even inflammatory bowel disease as he does have chronic diarrhea and has a family history of Crohn's  His last colonoscopy according to him was 5 years ago for similar symptoms which have persisted so he definitely Merits a GI workup             Reason for visit is   Slightly low white count with very mild neutropenia and a barely normal B12     Encounter provider Berenice Guzmán MD    Provider located at [address] [place] [phone]           The patient was identified by name and date of birth  oLreta Car was informed that this is a telemedicine visit and that the visit is being conducted through [facility] and patient was informed that this is a secure, HIPAA-compliant platform  He agrees to proceed     My office door was closed  No one else was in the room  He acknowledged consent and understanding of privacy and security of the video platform  The patient has agreed to participate and understands they can discontinue the visit at any time  Patient is aware this is a billable service  Subjective    Low white count with diarrhea    HPI     Loreta Car is a 36 y o  male  With a slightly low white count which has fluctuated over the last 2 years   White count is low while hemoglobin and platelets are normal  Most recent white count was 2 8 with a hemoglobin of 14 and a platelet count of 191  ANC was slightly low at 1 3  This was on January 21st   Prior to this in June of 2019 white count was normal   In May of 2019 it was slightly low at 3 0  Overall the patient is asymptomatic from this and denies any frequent infections  He has all sorts of other complaints including GI complaints, diarrhea and joint pains  I will order flow cytometry  I will order an MMA along with iron studies  I suspect this may be nutritional   He has had quite an extensive workup including an HIV panel which was negative, autoimmune testing which has been negative and an ESR which was normal   B12 is barely normal and on the low side of normal   I suspect this may be contributing so I will order an MMA and also check his iron studies  As far symptoms are concerned   He does have chronic diarrhea  Denies any fevers or chills  Denies any signs or symptoms of an infection  He does state he gets more bloating and diarrhea when he eats gluten and tries to avoid it  Definitely needs a GI workup  The rest of his 14 point review of systems today was negative    Testing so far appears to be negative for autoimmune disorder or infection causing his diarrhea and other symptoms  He does have very  Dry and scaly skin in states if he does not scrub his face every morning he gets a crusty residue on it  We may consider dermatology referral down the road also  Past Medical History:   Diagnosis Date    Cervical disc herniation     6/2/2014 per [employer]       Past Surgical History:   Procedure Laterality Date    HERNIA REPAIR         Current Outpatient Medications   Medication Sig Dispense Refill    Cholecalciferol (VITAMIN D) 2000 units CAPS Take 1 capsule by mouth daily      doxycycline hyclate (VIBRAMYCIN) 100 mg capsule Take 1 capsule (100 mg total) by mouth every 12 (twelve) hours 60 capsule 0    multivitamin (THERAGRAN) TABS Take 1 tablet by mouth daily      vitamin B-12 (VITAMIN B-12) 1,000 mcg tablet Take by mouth daily       No current facility-administered medications for this visit  Allergies   Allergen Reactions    Dairy Aid [Lactase] Other (See Comments) and GI Intolerance     Skin issues        Review of Systems   All other systems reviewed and are negative  Video Exam    There were no vitals filed for this visit  Physical Exam  Constitutional:       Appearance: He is well-developed  HENT:      Head: Normocephalic and atraumatic  Right Ear: External ear normal       Left Ear: External ear normal       Nose: Nose normal    Eyes:      Conjunctiva/sclera: Conjunctivae normal    Neck:      Musculoskeletal: Normal range of motion  Pulmonary:      Effort: Pulmonary effort is normal    Musculoskeletal: Normal range of motion  Neurological:      Mental Status: He is alert and oriented to person, place, and time  Psychiatric:         Behavior: Behavior normal          Thought Content:  Thought content normal          Judgment: Judgment normal           I spent 55 minutes with patient today in which greater than 50% of the time was spent in counseling/coordination of care regarding Low white count      VIRTUAL VISIT DISCLAIMER    Kady Sandoval acknowledges that he has consented to an online visit or consultation  He understands that the online visit is based solely on information provided by him, and that, in the absence of a face-to-face physical evaluation by the physician, the diagnosis he receives is both limited and provisional in terms of accuracy and completeness  This is not intended to replace a full medical face-to-face evaluation by the physician  Kady Sandoval understands and accepts these terms

## 2021-02-16 NOTE — TELEPHONE ENCOUNTER
Pt called and stated he has not received an email with instructions for his virtual visit today at 2pm       New email:  [email or web address]

## 2021-02-16 NOTE — TELEPHONE ENCOUNTER
Patient called stating he has not received [employer] Teams link to email  I confirmed email and it was incorrect  Email has been updated and prefers [employer] for virtual apt

## 2021-02-17 NOTE — TELEPHONE ENCOUNTER
Patient was scheduled via [employer] with the provider  Process for [employer] is that patient will receive a text with a link to click on before the virtual video appointment and then wait for the provider to join  No email with instructions is sent when using [employer]

## 2021-03-30 ENCOUNTER — TELEPHONE (OUTPATIENT)
Dept: HEMATOLOGY ONCOLOGY | Facility: CLINIC | Age: 41
End: 2021-03-30

## 2021-03-30 NOTE — TELEPHONE ENCOUNTER
Patient called stating he did not see his labs on Mychart   Patient has a appt with Dr Lewis at 3:40pm  Patient states he went to [facility] in [place] over a week ago  Patient would like to confirm labs are in    Please call patient back at [phone]

## 2021-03-30 NOTE — TELEPHONE ENCOUNTER
I called and informed the patient that some of the labs were still pending  He opted to reschedule   We will now see him on 4/8 at 3 PM

## 2021-04-08 ENCOUNTER — TELEPHONE (OUTPATIENT)
Dept: HEMATOLOGY ONCOLOGY | Facility: CLINIC | Age: 41
End: 2021-04-08

## 2021-04-08 ENCOUNTER — OFFICE VISIT (OUTPATIENT)
Dept: HEMATOLOGY ONCOLOGY | Facility: CLINIC | Age: 41
End: 2021-04-08
Payer: COMMERCIAL

## 2021-04-08 VITALS
WEIGHT: 158 LBS | OXYGEN SATURATION: 98 % | RESPIRATION RATE: 16 BRPM | TEMPERATURE: 98.4 F | DIASTOLIC BLOOD PRESSURE: 96 MMHG | HEART RATE: 98 BPM | BODY MASS INDEX: 22.12 KG/M2 | HEIGHT: 71 IN | SYSTOLIC BLOOD PRESSURE: 163 MMHG

## 2021-04-08 DIAGNOSIS — D72.819 LEUKOPENIA, UNSPECIFIED TYPE: Primary | ICD-10-CM

## 2021-04-08 PROCEDURE — 3008F BODY MASS INDEX DOCD: CPT | Performed by: INTERNAL MEDICINE

## 2021-04-08 PROCEDURE — 99214 OFFICE O/P EST MOD 30 MIN: CPT | Performed by: INTERNAL MEDICINE

## 2021-04-08 NOTE — PROGRESS NOTES
Hematology/Oncology Outpatient Follow- up Note  Charbel Meek 36 y o  male MRN: @ Encounter: [id number]        Date:  4/8/2021    Presenting Complaint/Diagnosis :   Slightly low white count    HPI:    Charbel Meek is a 36 y o  male  With a slightly low white count which has fluctuated over the last 2 years   White count is low while hemoglobin and platelets are normal  Most recent white count was 2 8 with a hemoglobin of 14 and a platelet count of 758  ANC was slightly low at 1 3  This was on January 21st   Prior to this in June of 2019 white count was normal   In May of 2019 it was slightly low at 3 0  Previous Hematologic/ Oncologic History:     workup    Current Hematologic/ Oncologic Treatment:     workup    Interval History:     the patient returns for follow-up visit  He states he is doing much better  He states he did cut off coffee which has helped his GI complaints  Still has some aches and pains  Has not seen a gastroenterologist yet but promises to do so as does his   Denies any nausea denies any vomiting denies any diarrhea  Does have some discomfort with bowel movements and I advised him to consider taking Metamucil and again go see a gastroenterologist which he promises he will do  Most recent blood work shows a white count 3 2 with a hemoglobin of 13 6 and platelet count of 73 5  Differential was normal   Flow cytometry was negative  Creatinine was slightly low at 0 75  The rest of the CMP was within acceptable limits  Albumin by globulin ratio was slightly high at 2 4  Iron studies were normal as was the MMA and ferritin  The rest of his 14 point review of systems today was negative  Test Results:    Imaging: No results found      Labs:   Lab Results   Component Value Date    WBC 2 8 (L) 01/21/2021    HGB 14 0 01/21/2021    HCT 41 2 01/21/2021    MCV 94 01/21/2021     01/21/2021     Lab Results   Component Value Date     07/20/2017    K 4 2 01/21/2021     01/21/2021    CO2 26 01/21/2021    BUN 13 01/21/2021    CREATININE 0 73 (L) 01/21/2021    GLUCOSE 99 07/20/2017    CALCIUM 9 4 07/20/2017    AST 14 01/21/2021    ALT 17 01/21/2021    ALKPHOS 74 07/20/2017    PROT 6 6 07/20/2017    BILITOT 0 7 07/20/2017         Lab Results   Component Value Date    WZSDVGHM36 249 01/21/2021         ROS: As stated in the history of present illness otherwise his 14 point review of systems today was negative  Active Problems:   Patient Active Problem List   Diagnosis    Vitamin D deficiency    Screening for cardiovascular condition    Screening for diabetes mellitus    Vegan       Past Medical History:   Past Medical History:   Diagnosis Date    Cervical disc herniation     6/2/2014 per [employer]       Surgical History:   Past Surgical History:   Procedure Laterality Date    HERNIA REPAIR         Family History:    Family History   Problem Relation Age of Onset    Hyperlipidemia Mother     Hypertension Mother     Atrial fibrillation Father     COPD Maternal Grandmother     Emphysema Maternal Grandmother     Uveitis Maternal Grandmother     Aortic aneurysm Maternal Grandmother     No Known Problems Sister     No Known Problems Brother     No Known Problems Maternal Aunt     No Known Problems Maternal Uncle     No Known Problems Paternal Aunt     No Known Problems Paternal Uncle     No Known Problems Maternal Grandfather     No Known Problems Paternal Grandmother     No Known Problems Paternal Grandfather     ADD / ADHD Neg Hx     Anesthesia problems Neg Hx     Cancer Neg Hx     Clotting disorder Neg Hx     Collagen disease Neg Hx     Diabetes Neg Hx     Dislocations Neg Hx     Learning disabilities Neg Hx     Neurological problems Neg Hx     Osteoporosis Neg Hx     Rheumatologic disease Neg Hx     Scoliosis Neg Hx     Vascular Disease Neg Hx        Cancer-related family history is negative for Cancer      Social History: Social History     Socioeconomic History    Marital status: /Civil Union     Spouse name: Not on file    Number of children: Not on file    Years of education: Not on file    Highest education level: Not on file   Occupational History    Not on file   Social Needs    Financial resource strain: Not on file    Food insecurity     Worry: Not on file     Inability: Not on file   [employer] needs     Medical: Not on file     Non-medical: Not on file   Tobacco Use    Smoking status: Former Smoker    Smokeless tobacco: Never Used   Substance and Sexual Activity    Alcohol use: No    Drug use: No    Sexual activity: Not on file   Lifestyle    Physical activity     Days per week: Not on file     Minutes per session: Not on file    Stress: Not on file   Relationships    Social connections     Talks on phone: Not on file     Gets together: Not on file     Attends Voodoo service: Not on file     Active member of club or organization: Not on file     Attends meetings of clubs or organizations: Not on file     Relationship status: Not on file    Intimate partner violence     Fear of current or ex partner: Not on file     Emotionally abused: Not on file     Physically abused: Not on file     Forced sexual activity: Not on file   Other Topics Concern    Not on file   Social History Narrative    Not on file       Current Medications:   Current Outpatient Medications   Medication Sig Dispense Refill    Cholecalciferol (VITAMIN D) 2000 units CAPS Take 1 capsule by mouth daily      multivitamin (THERAGRAN) TABS Take 1 tablet by mouth daily      vitamin B-12 (VITAMIN B-12) 1,000 mcg tablet Take by mouth daily       No current facility-administered medications for this visit  Allergies: Allergies   Allergen Reactions    Dairy Aid [Lactase] Other (See Comments) and GI Intolerance     Skin issues        Physical Exam:    Body surface area is 1 91 meters squared      Wt Readings from Last 3 Encounters:   04/08/21 71 7 kg (158 lb)   01/20/21 71 2 kg (157 lb)   12/23/19 68 5 kg (151 lb)        Temp Readings from Last 3 Encounters:   04/08/21 98 4 °F (36 9 °C) (Temporal)   01/20/21 97 8 °F (36 6 °C) (Tympanic)   08/08/18 98 8 °F (37 1 °C) (Tympanic)        BP Readings from Last 3 Encounters:   04/08/21 163/96   01/20/21 110/70   12/23/19 118/80         Pulse Readings from Last 3 Encounters:   04/08/21 98   01/20/21 68   12/23/19 68         Physical Exam     Constitutional   General appearance: No acute distress, well appearing and well nourished  Eyes   Conjunctiva and lids: No swelling, erythema or discharge  Pupils and irises: Equal, round and reactive to light  Ears, Nose, Mouth, and Throat   External inspection of ears and nose: Normal     Nasal mucosa, septum, and turbinates: Normal without edema or erythema  Oropharynx: Normal with no erythema, edema, exudate or lesions  Pulmonary   Respiratory effort: No increased work of breathing or signs of respiratory distress  Auscultation of lungs: Clear to auscultation  Cardiovascular   Palpation of heart: Normal PMI, no thrills  Auscultation of heart: Normal rate and rhythm, normal S1 and S2, without murmurs  Examination of extremities for edema and/or varicosities: Normal     Carotid pulses: Normal     Abdomen   Abdomen: Non-tender, no masses  Liver and spleen: No hepatomegaly or splenomegaly  Lymphatic   Palpation of lymph nodes in neck: No lymphadenopathy  Musculoskeletal   Gait and station: Normal     Digits and nails: Normal without clubbing or cyanosis  Inspection/palpation of joints, bones, and muscles: Normal     Skin   Skin and subcutaneous tissue: Normal without rashes or lesions  Neurologic   Cranial nerves: Cranial nerves 2-12 intact  Sensation: No sensory loss      Psychiatric   Orientation to person, place, and time: Normal     Mood and affect: Normal         Assessment / Plan:    Usha santoro a 36 y o  male  With a slightly low white count which has fluctuated over the last 2 years   White count is low while hemoglobin and platelets are normal   Overall the patient is asymptomatic from this and denies any frequent infections  His most recent blood count again is stable with white count 3 2 and a normal differential   Hemoglobin and platelet count were normal   Patient himself states he is at baseline so we will discontinue to follow as he is asymptomatic  I will see him back in 6 months with repeat blood work  He does need to see gastrologist any promises to do so  He will follow with his primary care physician for his generalized aches and pains  Goals and Barriers:  Current Goal:  Prolong Survival from   Low white count  Barriers: None  Patient's Capacity to Self Care:  Patient  able to self care  Portions of the record may have been created with voice recognition software   Occasional wrong word or "sound a like" substitutions may have occurred due to the inherent limitations of voice recognition software   Read the chart carefully and recognize, using context, where substitutions have occurred

## 2021-04-08 NOTE — TELEPHONE ENCOUNTER
Patient and his wife were in the office today to see Dr Pratibha Frazier for a 6 month follow-up  Patient's wife sent me an email with both positive and negative feedback about their visit  I was able to speak with the patient and apologize for the negativity experience they had with the Medical Assistant during their visit  I explained to the patient that this negative experience comes as a surprise since the Medical Assistant is typically appreciated by all of Dr Cole Carl patients  I went over the education that will take place with the Medical Assistant after this incident  Patient and Patient's wife voiced appreciation for Dr Pratibha Frazier and Ashlee's kind demeanor  They also voiced appreciation for my call  I offered to speak with the patient's wife in addition to speaking with the patient  Patient (patient's wife in the background) voiced this was not necessary

## 2021-04-09 ENCOUNTER — TELEPHONE (OUTPATIENT)
Dept: HEMATOLOGY ONCOLOGY | Facility: HOSPITAL | Age: 41
End: 2021-04-09

## 2021-04-09 NOTE — TELEPHONE ENCOUNTER
I called the patient and had a pleasant, short conversation with him apologizing for his bad experience yesterday 4/8  He thanked me and was very appreciative of the call

## 2021-05-18 ENCOUNTER — IMMUNIZATIONS (OUTPATIENT)
Dept: FAMILY MEDICINE CLINIC | Facility: HOSPITAL | Age: 41
End: 2021-05-18

## 2021-05-18 DIAGNOSIS — Z23 ENCOUNTER FOR IMMUNIZATION: Primary | ICD-10-CM

## 2021-05-18 PROCEDURE — 91301 SARS-COV-2 / COVID-19 MRNA VACCINE (MODERNA) 100 MCG: CPT

## 2021-05-18 PROCEDURE — 0011A SARS-COV-2 / COVID-19 MRNA VACCINE (MODERNA) 100 MCG: CPT

## 2021-06-16 ENCOUNTER — IMMUNIZATIONS (OUTPATIENT)
Dept: FAMILY MEDICINE CLINIC | Facility: HOSPITAL | Age: 41
End: 2021-06-16

## 2021-06-16 DIAGNOSIS — Z23 ENCOUNTER FOR IMMUNIZATION: Primary | ICD-10-CM

## 2021-06-16 PROCEDURE — 91301 SARS-COV-2 / COVID-19 MRNA VACCINE (MODERNA) 100 MCG: CPT

## 2021-06-16 PROCEDURE — 0012A SARS-COV-2 / COVID-19 MRNA VACCINE (MODERNA) 100 MCG: CPT

## 2021-10-08 ENCOUNTER — TELEPHONE (OUTPATIENT)
Dept: HEMATOLOGY ONCOLOGY | Facility: CLINIC | Age: 41
End: 2021-10-08

## 2021-10-10 LAB
ALBUMIN SERPL-MCNC: 4.7 G/DL (ref 4–5)
ALBUMIN/GLOB SERPL: 2.5 {RATIO} (ref 1.2–2.2)
ALP SERPL-CCNC: 76 IU/L (ref 44–121)
ALT SERPL-CCNC: 16 IU/L (ref 0–44)
AST SERPL-CCNC: 12 IU/L (ref 0–40)
BASOPHILS # BLD AUTO: 0 X10E3/UL (ref 0–0.2)
BASOPHILS NFR BLD AUTO: 1 %
BILIRUB SERPL-MCNC: 0.2 MG/DL (ref 0–1.2)
BUN SERPL-MCNC: 10 MG/DL (ref 6–24)
BUN/CREAT SERPL: 13 (ref 9–20)
CALCIUM SERPL-MCNC: 9.5 MG/DL (ref 8.7–10.2)
CHLORIDE SERPL-SCNC: 102 MMOL/L (ref 96–106)
CO2 SERPL-SCNC: 25 MMOL/L (ref 20–29)
CREAT SERPL-MCNC: 0.78 MG/DL (ref 0.76–1.27)
EOSINOPHIL # BLD AUTO: 0.1 X10E3/UL (ref 0–0.4)
EOSINOPHIL NFR BLD AUTO: 4 %
ERYTHROCYTE [DISTWIDTH] IN BLOOD BY AUTOMATED COUNT: 12.3 % (ref 11.6–15.4)
GLOBULIN SER-MCNC: 1.9 G/DL (ref 1.5–4.5)
GLUCOSE SERPL-MCNC: 101 MG/DL (ref 65–99)
HCT VFR BLD AUTO: 41.5 % (ref 37.5–51)
HGB BLD-MCNC: 13.9 G/DL (ref 13–17.7)
IMM GRANULOCYTES # BLD: 0 X10E3/UL (ref 0–0.1)
IMM GRANULOCYTES NFR BLD: 0 %
LYMPHOCYTES # BLD AUTO: 1.4 X10E3/UL (ref 0.7–3.1)
LYMPHOCYTES NFR BLD AUTO: 41 %
MCH RBC QN AUTO: 31.5 PG (ref 26.6–33)
MCHC RBC AUTO-ENTMCNC: 33.5 G/DL (ref 31.5–35.7)
MCV RBC AUTO: 94 FL (ref 79–97)
MONOCYTES # BLD AUTO: 0.4 X10E3/UL (ref 0.1–0.9)
MONOCYTES NFR BLD AUTO: 11 %
NEUTROPHILS # BLD AUTO: 1.4 X10E3/UL (ref 1.4–7)
NEUTROPHILS NFR BLD AUTO: 43 %
PLATELET # BLD AUTO: 237 X10E3/UL (ref 150–450)
POTASSIUM SERPL-SCNC: 4 MMOL/L (ref 3.5–5.2)
PROT SERPL-MCNC: 6.6 G/DL (ref 6–8.5)
RBC # BLD AUTO: 4.41 X10E6/UL (ref 4.14–5.8)
SL AMB EGFR AFRICAN AMERICAN: 131 ML/MIN/1.73
SL AMB EGFR NON AFRICAN AMERICAN: 113 ML/MIN/1.73
SODIUM SERPL-SCNC: 139 MMOL/L (ref 134–144)
WBC # BLD AUTO: 3.3 X10E3/UL (ref 3.4–10.8)

## 2021-10-12 ENCOUNTER — OFFICE VISIT (OUTPATIENT)
Dept: HEMATOLOGY ONCOLOGY | Facility: CLINIC | Age: 41
End: 2021-10-12
Payer: COMMERCIAL

## 2021-10-12 VITALS
BODY MASS INDEX: 21.77 KG/M2 | WEIGHT: 155.5 LBS | HEART RATE: 81 BPM | RESPIRATION RATE: 17 BRPM | SYSTOLIC BLOOD PRESSURE: 142 MMHG | TEMPERATURE: 97.6 F | HEIGHT: 71 IN | OXYGEN SATURATION: 97 % | DIASTOLIC BLOOD PRESSURE: 80 MMHG

## 2021-10-12 DIAGNOSIS — D72.819 LEUKOPENIA, UNSPECIFIED TYPE: Primary | ICD-10-CM

## 2021-10-12 PROCEDURE — 3008F BODY MASS INDEX DOCD: CPT | Performed by: INTERNAL MEDICINE

## 2021-10-12 PROCEDURE — 1036F TOBACCO NON-USER: CPT | Performed by: INTERNAL MEDICINE

## 2021-10-12 PROCEDURE — 99214 OFFICE O/P EST MOD 30 MIN: CPT | Performed by: INTERNAL MEDICINE

## 2022-03-14 ENCOUNTER — OFFICE VISIT (OUTPATIENT)
Dept: FAMILY MEDICINE CLINIC | Facility: CLINIC | Age: 42
End: 2022-03-14
Payer: COMMERCIAL

## 2022-03-14 VITALS
BODY MASS INDEX: 22.12 KG/M2 | SYSTOLIC BLOOD PRESSURE: 122 MMHG | TEMPERATURE: 97.8 F | HEART RATE: 72 BPM | RESPIRATION RATE: 16 BRPM | DIASTOLIC BLOOD PRESSURE: 80 MMHG | WEIGHT: 158 LBS | HEIGHT: 71 IN

## 2022-03-14 DIAGNOSIS — F41.8 SITUATIONAL ANXIETY: Primary | ICD-10-CM

## 2022-03-14 DIAGNOSIS — G47.9 SLEEP DISTURBANCES: ICD-10-CM

## 2022-03-14 PROCEDURE — 1036F TOBACCO NON-USER: CPT | Performed by: FAMILY MEDICINE

## 2022-03-14 PROCEDURE — 99213 OFFICE O/P EST LOW 20 MIN: CPT | Performed by: FAMILY MEDICINE

## 2022-03-14 PROCEDURE — 3008F BODY MASS INDEX DOCD: CPT | Performed by: FAMILY MEDICINE

## 2022-03-14 RX ORDER — ALPRAZOLAM 0.5 MG/1
0.5 TABLET ORAL
Qty: 30 TABLET | Refills: 0 | Status: SHIPPED | OUTPATIENT
Start: 2022-03-14

## 2022-03-14 NOTE — PROGRESS NOTES
Chief Complaint   Patient presents with    Follow-up   stress      Patient ID: Nas Caban is a 39 y o  male  HPI  Pt is seeing for worsening stress recently -  Having sleep disturbances -  Feeling anxious a lot -  No depressive symptoms, no SI - never been Tx     The following portions of the patient's history were reviewed and updated as appropriate: allergies, current medications, past family history, past medical history, past social history, past surgical history and problem list     Review of Systems   Constitutional: Negative  Respiratory: Negative  Musculoskeletal: Negative  Neurological: Negative  Psychiatric/Behavioral: Positive for decreased concentration and sleep disturbance  Negative for agitation, behavioral problems, hallucinations, self-injury and suicidal ideas  The patient is nervous/anxious  Current Outpatient Medications   Medication Sig Dispense Refill    Cholecalciferol (VITAMIN D) 2000 units CAPS Take 1 capsule by mouth daily      multivitamin (THERAGRAN) TABS Take 1 tablet by mouth daily      vitamin B-12 (VITAMIN B-12) 1,000 mcg tablet Take by mouth daily       No current facility-administered medications for this visit  Objective:    /80   Pulse 72   Temp 97 8 °F (36 6 °C)   Resp 16   Ht 5' 11" (1 803 m)   Wt 71 7 kg (158 lb)   BMI 22 04 kg/m²        Physical Exam  Neurological:      General: No focal deficit present  Mental Status: He is alert and oriented to person, place, and time  Psychiatric:         Mood and Affect: Mood normal          Thought Content: Thought content normal          Judgment: Judgment normal                  Assessment/Plan:         Diagnoses and all orders for this visit:    Situational anxiety  -     ALPRAZolam (XANAX) 0 5 mg tablet; Take 1 tablet (0 5 mg total) by mouth daily at bedtime as needed for anxiety or sleep  -     Ambulatory Referral to [facility];  Future    Sleep disturbances  - ALPRAZolam (XANAX) 0 5 mg tablet; Take 1 tablet (0 5 mg total) by mouth daily at bedtime as needed for anxiety or sleep  -     [facility];  Future            rto in 1 m                 Deepti Sutton MD

## 2022-03-14 NOTE — LETTER
March 14, 2022     Patient: Renetta Sánchez   YOB: 1980   Date of Visit: 3/14/2022       To Whom it May Concern:    Jorge A Mckee is under my professional care  He was seen in my office on 3/14/2022  He may return to work on 3/22/2022   If you have any questions or concerns, please don't hesitate to call           Sincerely,          Beauford Felty, MD        CC: No Recipients

## 2022-03-30 ENCOUNTER — TELEPHONE (OUTPATIENT)
Dept: PSYCHIATRY | Facility: CLINIC | Age: 42
End: 2022-03-30

## 2022-04-05 ENCOUNTER — TELEPHONE (OUTPATIENT)
Dept: PSYCHIATRY | Facility: CLINIC | Age: 42
End: 2022-04-05

## 2022-06-13 ENCOUNTER — TELEPHONE (OUTPATIENT)
Dept: HEMATOLOGY ONCOLOGY | Facility: CLINIC | Age: 42
End: 2022-06-13

## 2022-07-07 ENCOUNTER — TELEPHONE (OUTPATIENT)
Dept: HEMATOLOGY ONCOLOGY | Facility: CLINIC | Age: 42
End: 2022-07-07

## 2022-07-07 NOTE — TELEPHONE ENCOUNTER
Appointment Cancellation Or Reschedule     Person calling in Patient    Provider Dr Tod Gowers   Office Visit Date and Time 08/03 at 2:40pm   Office Visit Location [facility]   Did patient want to reschedule their office appointment? If so, when was it scheduled to? no   Is this patient calling to reschedule an infusion appointment? no   When is their next infusion appointment? n/a   Is this patient a Chemo patient? no   Reason for Cancellation or Reschedule Patient canceled, call back to reschedule     If the patient is a treatment patient, please route this to the office nurse  If the patient is not on treatment, please route to the office MA  If the patient is a surgical oncology patient, please route to surg/onc clinical pool

## 2023-03-21 ENCOUNTER — OFFICE VISIT (OUTPATIENT)
Dept: FAMILY MEDICINE CLINIC | Facility: CLINIC | Age: 43
End: 2023-03-21

## 2023-03-21 VITALS
HEIGHT: 71 IN | TEMPERATURE: 98.2 F | SYSTOLIC BLOOD PRESSURE: 108 MMHG | DIASTOLIC BLOOD PRESSURE: 80 MMHG | BODY MASS INDEX: 23.13 KG/M2 | WEIGHT: 165.2 LBS | RESPIRATION RATE: 18 BRPM | HEART RATE: 82 BPM | OXYGEN SATURATION: 97 %

## 2023-03-21 DIAGNOSIS — Z00.00 ANNUAL PHYSICAL EXAM: Primary | ICD-10-CM

## 2023-03-21 DIAGNOSIS — R05.2 SUBACUTE COUGH: Primary | ICD-10-CM

## 2023-03-21 RX ORDER — BUDESONIDE AND FORMOTEROL FUMARATE DIHYDRATE 160; 4.5 UG/1; UG/1
2 AEROSOL RESPIRATORY (INHALATION) 2 TIMES DAILY
Qty: 10.2 G | Refills: 0 | Status: SHIPPED | OUTPATIENT
Start: 2023-03-21

## 2023-03-21 NOTE — PROGRESS NOTES
Chief Complaint   Patient presents with   • Cough     Pt c/o intermittent coughing for a couple of months        Patient ID: Dione Lemons is a 43 y o  male  Cough  This is a recurrent problem  The current episode started more than 1 month ago  The problem has been gradually worsening  The problem occurs every few hours  The cough is non-productive  Associated symptoms include headaches and shortness of breath  Pertinent negatives include no chest pain, chills, ear congestion, ear pain, fever, heartburn, hemoptysis, myalgias, nasal congestion, postnasal drip, rash, rhinorrhea, sore throat, sweats, weight loss or wheezing  Nothing aggravates the symptoms  He has tried nothing for the symptoms  The treatment provided no relief  His past medical history is significant for environmental allergies  There is no history of asthma, bronchiectasis, bronchitis, COPD, emphysema or pneumonia  The following portions of the patient's history were reviewed and updated as appropriate: allergies, current medications, past family history, past medical history, past social history, past surgical history and problem list     Review of Systems   Constitutional: Negative for chills, fever and weight loss  HENT: Negative for ear pain, postnasal drip, rhinorrhea and sore throat  Respiratory: Positive for cough and shortness of breath  Negative for hemoptysis and wheezing  Cardiovascular: Negative for chest pain  Gastrointestinal: Negative for heartburn  Musculoskeletal: Negative for myalgias  Skin: Negative for rash  Allergic/Immunologic: Positive for environmental allergies  Neurological: Positive for headaches         Current Outpatient Medications   Medication Sig Dispense Refill   • Cholecalciferol (VITAMIN D) 2000 units CAPS Take 1 capsule by mouth daily     • multivitamin (THERAGRAN) TABS Take 1 tablet by mouth daily     • vitamin B-12 (VITAMIN B-12) 1,000 mcg tablet Take by mouth daily     • ALPRAZolam Fanny Delgadillo) 0 5 mg tablet Take 1 tablet (0 5 mg total) by mouth daily at bedtime as needed for anxiety or sleep (Patient not taking: Reported on 3/21/2023) 30 tablet 0     No current facility-administered medications for this visit  Objective:    /80 (BP Location: Left arm, Patient Position: Sitting, Cuff Size: Standard)   Pulse 82   Temp 98 2 °F (36 8 °C)   Resp 18   Ht 5' 11" (1 803 m)   Wt 74 9 kg (165 lb 3 2 oz)   SpO2 97%   BMI 23 04 kg/m²        Physical Exam  Constitutional:       Appearance: He is not ill-appearing  HENT:      Nose: No congestion or rhinorrhea  Mouth/Throat:      Pharynx: No oropharyngeal exudate or posterior oropharyngeal erythema  Cardiovascular:      Rate and Rhythm: Normal rate and regular rhythm  Heart sounds: No murmur heard  Pulmonary:      Effort: Pulmonary effort is normal  No respiratory distress  Breath sounds: No wheezing, rhonchi or rales  Neurological:      Mental Status: He is alert  Assessment/Plan:         Diagnoses and all orders for this visit:    Subacute cough  -     XR chest pa & lateral; Future  -     budesonide-formoterol (Symbicort) 160-4 5 mcg/act inhaler; Inhale 2 puffs 2 (two) times a day Rinse mouth after use          rto in 1 m for annual PE and f/u                 Enrique Katz MD

## 2023-03-23 ENCOUNTER — HOSPITAL ENCOUNTER (OUTPATIENT)
Dept: RADIOLOGY | Facility: HOSPITAL | Age: 43
Discharge: HOME | End: 2023-03-23

## 2023-03-23 DIAGNOSIS — R05.2 SUBACUTE COUGH: ICD-10-CM

## 2023-03-24 LAB
25(OH)D3+25(OH)D2 SERPL-MCNC: 23.2 NG/ML (ref 30–100)
ALBUMIN SERPL-MCNC: 4.6 G/DL (ref 4–5)
ALBUMIN/GLOB SERPL: 2.6 {RATIO} (ref 1.2–2.2)
ALP SERPL-CCNC: 81 IU/L (ref 44–121)
ALT SERPL-CCNC: 13 IU/L (ref 0–44)
AST SERPL-CCNC: 15 IU/L (ref 0–40)
BASOPHILS # BLD AUTO: 0 X10E3/UL (ref 0–0.2)
BASOPHILS NFR BLD AUTO: 1 %
BILIRUB SERPL-MCNC: 0.5 MG/DL (ref 0–1.2)
BUN SERPL-MCNC: 18 MG/DL (ref 6–24)
BUN/CREAT SERPL: 22 (ref 9–20)
CALCIUM SERPL-MCNC: 9.2 MG/DL (ref 8.7–10.2)
CHLORIDE SERPL-SCNC: 103 MMOL/L (ref 96–106)
CHOLEST SERPL-MCNC: 161 MG/DL (ref 100–199)
CO2 SERPL-SCNC: 25 MMOL/L (ref 20–29)
CREAT SERPL-MCNC: 0.81 MG/DL (ref 0.76–1.27)
EGFR: 113 ML/MIN/1.73
EOSINOPHIL # BLD AUTO: 0.2 X10E3/UL (ref 0–0.4)
EOSINOPHIL NFR BLD AUTO: 5 %
ERYTHROCYTE [DISTWIDTH] IN BLOOD BY AUTOMATED COUNT: 11.8 % (ref 11.6–15.4)
GLOBULIN SER-MCNC: 1.8 G/DL (ref 1.5–4.5)
GLUCOSE SERPL-MCNC: 100 MG/DL (ref 70–99)
HBA1C MFR BLD: 5.4 % (ref 4.8–5.6)
HCT VFR BLD AUTO: 42.4 % (ref 37.5–51)
HDLC SERPL-MCNC: 39 MG/DL
HGB BLD-MCNC: 14.4 G/DL (ref 13–17.7)
IMM GRANULOCYTES # BLD: 0 X10E3/UL (ref 0–0.1)
IMM GRANULOCYTES NFR BLD: 0 %
LDLC SERPL CALC-MCNC: 101 MG/DL (ref 0–99)
LYMPHOCYTES # BLD AUTO: 1.5 X10E3/UL (ref 0.7–3.1)
LYMPHOCYTES NFR BLD AUTO: 42 %
MCH RBC QN AUTO: 32.2 PG (ref 26.6–33)
MCHC RBC AUTO-ENTMCNC: 34 G/DL (ref 31.5–35.7)
MCV RBC AUTO: 95 FL (ref 79–97)
MICRODELETION SYND BLD/T FISH: NORMAL
MONOCYTES # BLD AUTO: 0.4 X10E3/UL (ref 0.1–0.9)
MONOCYTES NFR BLD AUTO: 11 %
NEUTROPHILS # BLD AUTO: 1.4 X10E3/UL (ref 1.4–7)
NEUTROPHILS NFR BLD AUTO: 41 %
PLATELET # BLD AUTO: 244 X10E3/UL (ref 150–450)
POTASSIUM SERPL-SCNC: 4.4 MMOL/L (ref 3.5–5.2)
PROT SERPL-MCNC: 6.4 G/DL (ref 6–8.5)
RBC # BLD AUTO: 4.47 X10E6/UL (ref 4.14–5.8)
SL AMB VLDL CHOLESTEROL CALC: 21 MG/DL (ref 5–40)
SODIUM SERPL-SCNC: 141 MMOL/L (ref 134–144)
TRIGL SERPL-MCNC: 118 MG/DL (ref 0–149)
TSH SERPL DL<=0.005 MIU/L-ACNC: 2.1 UIU/ML (ref 0.45–4.5)
VIT B12 SERPL-MCNC: 347 PG/ML (ref 232–1245)
WBC # BLD AUTO: 3.5 X10E3/UL (ref 3.4–10.8)

## 2023-04-04 ENCOUNTER — OFFICE VISIT (OUTPATIENT)
Dept: FAMILY MEDICINE CLINIC | Facility: CLINIC | Age: 43
End: 2023-04-04

## 2023-04-04 VITALS
HEART RATE: 88 BPM | SYSTOLIC BLOOD PRESSURE: 112 MMHG | DIASTOLIC BLOOD PRESSURE: 82 MMHG | WEIGHT: 164.4 LBS | RESPIRATION RATE: 18 BRPM | HEIGHT: 71 IN | TEMPERATURE: 97.9 F | BODY MASS INDEX: 23.02 KG/M2

## 2023-04-04 DIAGNOSIS — Z00.00 WELL ADULT EXAM: Primary | ICD-10-CM

## 2023-04-04 DIAGNOSIS — R05.2 SUBACUTE COUGH: ICD-10-CM

## 2023-04-04 DIAGNOSIS — E55.9 VITAMIN D DEFICIENCY: ICD-10-CM

## 2023-04-04 RX ORDER — FLUTICASONE PROPIONATE AND SALMETEROL 250; 50 UG/1; UG/1
1 POWDER RESPIRATORY (INHALATION) 2 TIMES DAILY
Qty: 60 BLISTER | Refills: 5 | Status: SHIPPED | OUTPATIENT
Start: 2023-04-04 | End: 2023-10-01

## 2023-04-04 NOTE — PROGRESS NOTES
Chief Complaint   Patient presents with   • Annual Exam        Patient ID: Magdy Powers is a 43 y o  male  HPI  Pt is seeing for CPE     The following portions of the patient's history were reviewed and updated as appropriate: allergies, current medications, past family history, past medical history, past social history, past surgical history and problem list     Review of Systems   Constitutional: Negative  Negative for fatigue, fever and unexpected weight change  HENT: Negative for congestion, ear discharge, ear pain, hearing loss, rhinorrhea, sinus pressure, sore throat and trouble swallowing  Eyes: Negative  Respiratory: Positive for cough (x 2 m -  normal CXR, slowly improving- was Rx Symbicort -  not covered )  Negative for apnea, chest tightness, shortness of breath and wheezing  Cardiovascular: Negative  Gastrointestinal: Negative  Endocrine: Negative  Genitourinary: Negative  Musculoskeletal: Negative  Skin: Negative  Neurological: Negative  Negative for dizziness, weakness, light-headedness and numbness  Hematological: Negative  Psychiatric/Behavioral: Negative  Current Outpatient Medications   Medication Sig Dispense Refill   • Cholecalciferol (VITAMIN D) 2000 units CAPS Take 1 capsule by mouth daily     • Fluticasone-Salmeterol (Advair) 250-50 mcg/dose inhaler Inhale 1 puff 2 (two) times a day Rinse mouth after use  60 blister 5   • multivitamin (THERAGRAN) TABS Take 1 tablet by mouth daily     • vitamin B-12 (VITAMIN B-12) 1,000 mcg tablet Take by mouth daily     • ALPRAZolam (XANAX) 0 5 mg tablet Take 1 tablet (0 5 mg total) by mouth daily at bedtime as needed for anxiety or sleep (Patient not taking: Reported on 3/21/2023) 30 tablet 0     No current facility-administered medications for this visit         Objective:    /82 (BP Location: Left arm, Patient Position: Sitting, Cuff Size: Large)   Pulse 88   Temp 97 9 °F (36 6 °C)   Resp 18   Ht 5' "11\" (1 803 m)   Wt 74 6 kg (164 lb 6 4 oz)   BMI 22 93 kg/m²        Physical Exam  Constitutional:       General: He is not in acute distress  Appearance: He is well-developed  He is not ill-appearing  HENT:      Head: Normocephalic and atraumatic  Right Ear: Hearing, tympanic membrane, ear canal and external ear normal       Left Ear: Hearing, tympanic membrane, ear canal and external ear normal       Nose: No congestion or rhinorrhea  Mouth/Throat:      Pharynx: No oropharyngeal exudate or posterior oropharyngeal erythema  Eyes:      Extraocular Movements: Extraocular movements intact  Conjunctiva/sclera: Conjunctivae normal    Neck:      Thyroid: No thyroid mass or thyromegaly  Vascular: No JVD  Cardiovascular:      Rate and Rhythm: Normal rate and regular rhythm  Heart sounds: Normal heart sounds  No murmur heard  No gallop  Pulmonary:      Effort: No respiratory distress  Breath sounds: Normal breath sounds  No wheezing, rhonchi or rales  Abdominal:      General: Bowel sounds are normal       Palpations: Abdomen is soft  Tenderness: There is no abdominal tenderness  Musculoskeletal:      Cervical back: Normal range of motion and neck supple  Right lower leg: No edema  Left lower leg: No edema  Lymphadenopathy:      Cervical: No cervical adenopathy  Skin:     Coloration: Skin is not pale  Findings: No rash  Neurological:      General: No focal deficit present  Mental Status: He is alert and oriented to person, place, and time  Cranial Nerves: No cranial nerve deficit  Psychiatric:         Mood and Affect: Mood normal          Behavior: Behavior normal          Thought Content: Thought content normal          Judgment: Judgment normal            Labs in chart were reviewed    Recent Results (from the past 672 hour(s))   Caren Hernandez Default    Collection Time: 03/23/23  7:06 AM   Result Value Ref Range    White Blood Cell " Count 3 5 3 4 - 10 8 x10E3/uL    Red Blood Cell Count 4 47 4 14 - 5 80 x10E6/uL    Hemoglobin 14 4 13 0 - 17 7 g/dL    HCT 42 4 37 5 - 51 0 %    MCV 95 79 - 97 fL    MCH 32 2 26 6 - 33 0 pg    MCHC 34 0 31 5 - 35 7 g/dL    RDW 11 8 11 6 - 15 4 %    Platelet Count 471 929 - 450 x10E3/uL    Neutrophils 41 Not Estab  %    Lymphocytes 42 Not Estab  %    Monocytes 11 Not Estab  %    Eosinophils 5 Not Estab  %    Basophils PCT 1 Not Estab  %    Neutrophils (Absolute) 1 4 1 4 - 7 0 x10E3/uL    Lymphocytes (Absolute) 1 5 0 7 - 3 1 x10E3/uL    Monocytes (Absolute) 0 4 0 1 - 0 9 x10E3/uL    Eosinophils (Absolute) 0 2 0 0 - 0 4 x10E3/uL    Basophils ABS 0 0 0 0 - 0 2 x10E3/uL    Immature Granulocytes 0 Not Estab  %    Immature Granulocytes (Absolute) 0 0 0 0 - 0 1 x10E3/uL   Comprehensive metabolic panel    Collection Time: 03/23/23  7:06 AM   Result Value Ref Range    Glucose, Random 100 (H) 70 - 99 mg/dL    BUN 18 6 - 24 mg/dL    Creatinine 0 81 0 76 - 1 27 mg/dL    eGFR 113 >59 mL/min/1 73    SL AMB BUN/CREATININE RATIO 22 (H) 9 - 20    Sodium 141 134 - 144 mmol/L    Potassium 4 4 3 5 - 5 2 mmol/L    Chloride 103 96 - 106 mmol/L    CO2 25 20 - 29 mmol/L    CALCIUM 9 2 8 7 - 10 2 mg/dL    Protein, Total 6 4 6 0 - 8 5 g/dL    Albumin 4 6 4 0 - 5 0 g/dL    Globulin, Total 1 8 1 5 - 4 5 g/dL    Albumin/Globulin Ratio 2 6 (H) 1 2 - 2 2    TOTAL BILIRUBIN 0 5 0 0 - 1 2 mg/dL    Alk Phos Isoenzymes 81 44 - 121 IU/L    AST 15 0 - 40 IU/L    ALT 13 0 - 44 IU/L   Lipid panel    Collection Time: 03/23/23  7:06 AM   Result Value Ref Range    Cholesterol, Total 161 100 - 199 mg/dL    Triglycerides 118 0 - 149 mg/dL    HDL 39 (L) >39 mg/dL    VLDL Cholesterol Calculated 21 5 - 40 mg/dL    LDL Calculated 101 (H) 0 - 99 mg/dL   Hemoglobin A1c (w/out EAG)    Collection Time: 03/23/23  7:06 AM   Result Value Ref Range    Hemoglobin A1C 5 4 4 8 - 5 6 %   TSH, 3rd generation    Collection Time: 03/23/23  7:06 AM   Result Value Ref Range    TSH 2 100 0 450 - 4 500 uIU/mL   Vitamin D 25 hydroxy    Collection Time: 03/23/23  7:06 AM   Result Value Ref Range    25-HYDROXY VIT D 23 2 (L) 30 0 - 100 0 ng/mL   Vitamin B12    Collection Time: 03/23/23  7:06 AM   Result Value Ref Range    Vitamin B-12 347 232 - 1,245 pg/mL   Cardiovascular Report    Collection Time: 03/23/23  7:06 AM   Result Value Ref Range    Interpretation Note        Assessment/Plan:         Diagnoses and all orders for this visit:    Well adult exam    Subacute cough  -     Fluticasone-Salmeterol (Advair) 250-50 mcg/dose inhaler; Inhale 1 puff 2 (two) times a day Rinse mouth after use      Vitamin D deficiency    will take Vit D 2000 IU  daily         rto in 1 y                 Esther Monahan MD

## 2023-07-12 ENCOUNTER — OFFICE VISIT (OUTPATIENT)
Dept: FAMILY MEDICINE CLINIC | Facility: CLINIC | Age: 43
End: 2023-07-12
Payer: COMMERCIAL

## 2023-07-12 VITALS
WEIGHT: 168.4 LBS | BODY MASS INDEX: 23.57 KG/M2 | RESPIRATION RATE: 16 BRPM | TEMPERATURE: 98.3 F | HEART RATE: 64 BPM | HEIGHT: 71 IN | SYSTOLIC BLOOD PRESSURE: 122 MMHG | OXYGEN SATURATION: 98 % | DIASTOLIC BLOOD PRESSURE: 84 MMHG

## 2023-07-12 DIAGNOSIS — R05.3 CHRONIC COUGH: Primary | ICD-10-CM

## 2023-07-12 PROCEDURE — 99213 OFFICE O/P EST LOW 20 MIN: CPT | Performed by: FAMILY MEDICINE

## 2023-07-12 RX ORDER — MONTELUKAST SODIUM 10 MG/1
10 TABLET ORAL
Qty: 30 TABLET | Refills: 1 | Status: SHIPPED | OUTPATIENT
Start: 2023-07-12

## 2023-07-12 NOTE — PROGRESS NOTES
Chief Complaint   Patient presents with   • Shortness of Breath     Patient had shortness of breath for alittle under a year with a cough         Patient ID: Riya Gage is a 43 y.o. male. Cough  This is a chronic problem. The current episode started more than 1 year ago. The problem has been unchanged. The problem occurs every few hours (epsidoe lasting 30 sec -  3-5 times per day ). The cough is non-productive. Associated symptoms include nasal congestion, postnasal drip, rhinorrhea and shortness of breath. Pertinent negatives include no chest pain, chills, ear congestion, ear pain, fever, headaches, heartburn, hemoptysis, myalgias, rash, sore throat, sweats, weight loss or wheezing. Nothing aggravates the symptoms. He has tried nothing for the symptoms. The treatment provided no relief. His past medical history is significant for environmental allergies. There is no history of asthma, bronchiectasis, bronchitis, COPD, emphysema or pneumonia. normal CXR 3 m ago     The following portions of the patient's history were reviewed and updated as appropriate: allergies, current medications, past family history, past medical history, past social history, past surgical history and problem list.    Review of Systems   Constitutional: Negative. Negative for chills, fever and weight loss. HENT: Positive for congestion, postnasal drip and rhinorrhea. Negative for ear pain, sinus pressure, sinus pain, sore throat, trouble swallowing and voice change. Respiratory: Positive for cough and shortness of breath. Negative for hemoptysis, chest tightness and wheezing. Cardiovascular: Negative. Negative for chest pain. Gastrointestinal: Negative. Negative for heartburn. Genitourinary: Negative. Musculoskeletal: Negative for myalgias. Skin: Negative for rash. Allergic/Immunologic: Positive for environmental allergies. Neurological: Negative for headaches.        Current Outpatient Medications   Medication Sig Dispense Refill   • Cholecalciferol (VITAMIN D) 2000 units CAPS Take 1 capsule by mouth daily     • multivitamin (THERAGRAN) TABS Take 1 tablet by mouth daily     • vitamin B-12 (VITAMIN B-12) 1,000 mcg tablet Take by mouth daily     • ALPRAZolam (XANAX) 0.5 mg tablet Take 1 tablet (0.5 mg total) by mouth daily at bedtime as needed for anxiety or sleep (Patient not taking: Reported on 7/12/2023) 30 tablet 0   • Fluticasone-Salmeterol (Advair) 250-50 mcg/dose inhaler Inhale 1 puff 2 (two) times a day Rinse mouth after use. (Patient not taking: Reported on 7/12/2023) 60 blister 5     No current facility-administered medications for this visit. Objective:    /84 (BP Location: Left arm, Patient Position: Sitting, Cuff Size: Large)   Pulse 64   Temp 98.3 °F (36.8 °C)   Resp 16   Ht 5' 11" (1.803 m)   Wt 76.4 kg (168 lb 6.4 oz)   SpO2 98%   BMI 23.49 kg/m²        Physical Exam  Constitutional:       Appearance: He is not ill-appearing. HENT:      Nose: No congestion or rhinorrhea. Cardiovascular:      Rate and Rhythm: Normal rate and regular rhythm. Pulmonary:      Effort: Pulmonary effort is normal.      Breath sounds: No wheezing, rhonchi or rales. Neurological:      Mental Status: He is alert. Assessment/Plan:         Diagnoses and all orders for this visit:    Chronic cough  -     montelukast (SINGULAIR) 10 mg tablet; Take 1 tablet (10 mg total) by mouth daily at bedtime  -     Complete PFT without post bronchodilator;  Future      Xyzal 5 mg daily OTC     rto prn               Sandra Redd MD

## 2023-07-19 ENCOUNTER — OFFICE VISIT (OUTPATIENT)
Dept: FAMILY MEDICINE CLINIC | Facility: CLINIC | Age: 43
End: 2023-07-19
Payer: COMMERCIAL

## 2023-07-19 VITALS
RESPIRATION RATE: 18 BRPM | WEIGHT: 166.4 LBS | DIASTOLIC BLOOD PRESSURE: 76 MMHG | TEMPERATURE: 98.4 F | HEIGHT: 71 IN | BODY MASS INDEX: 23.3 KG/M2 | OXYGEN SATURATION: 96 % | SYSTOLIC BLOOD PRESSURE: 112 MMHG | HEART RATE: 80 BPM

## 2023-07-19 DIAGNOSIS — J06.9 URI, ACUTE: Primary | ICD-10-CM

## 2023-07-19 DIAGNOSIS — R05.1 ACUTE COUGH: ICD-10-CM

## 2023-07-19 LAB
SARS-COV-2 AG UPPER RESP QL IA: NEGATIVE
VALID CONTROL: NORMAL

## 2023-07-19 PROCEDURE — 99213 OFFICE O/P EST LOW 20 MIN: CPT | Performed by: FAMILY MEDICINE

## 2023-07-19 PROCEDURE — 87811 SARS-COV-2 COVID19 W/OPTIC: CPT | Performed by: FAMILY MEDICINE

## 2023-07-19 RX ORDER — AZITHROMYCIN 250 MG/1
TABLET, FILM COATED ORAL
Qty: 6 TABLET | Refills: 0 | Status: SHIPPED | OUTPATIENT
Start: 2023-07-19 | End: 2023-07-23

## 2023-07-19 NOTE — PROGRESS NOTES
Chief Complaint   Patient presents with   • Headache   • Generalized Body Aches   • Cough   • Nasal Congestion        Patient ID: Jesus Manuel Freeman is a 43 y.o. male. URI   This is a new problem. Episode onset: 5 days ago  The problem has been unchanged. There has been no fever. Associated symptoms include congestion, coughing, headaches, rhinorrhea and a sore throat. Pertinent negatives include no abdominal pain, chest pain, diarrhea, dysuria, ear pain, joint pain, joint swelling, nausea, neck pain, plugged ear sensation, rash, sinus pain, sneezing, swollen glands, vomiting or wheezing. He has tried increased fluids and decongestant for the symptoms. The treatment provided mild relief. The following portions of the patient's history were reviewed and updated as appropriate: allergies, current medications, past family history, past medical history, past social history, past surgical history and problem list.    Review of Systems   HENT: Positive for congestion, rhinorrhea and sore throat. Negative for ear pain, sinus pain and sneezing. Respiratory: Positive for cough. Negative for wheezing. Cardiovascular: Negative for chest pain. Gastrointestinal: Negative for abdominal pain, diarrhea, nausea and vomiting. Genitourinary: Negative for dysuria. Musculoskeletal: Negative for joint pain and neck pain. Skin: Negative for rash. Neurological: Positive for headaches.        Current Outpatient Medications   Medication Sig Dispense Refill   • Cholecalciferol (VITAMIN D) 2000 units CAPS Take 1 capsule by mouth daily     • montelukast (SINGULAIR) 10 mg tablet Take 1 tablet (10 mg total) by mouth daily at bedtime 30 tablet 1   • multivitamin (THERAGRAN) TABS Take 1 tablet by mouth daily     • vitamin B-12 (VITAMIN B-12) 1,000 mcg tablet Take by mouth daily     • ALPRAZolam (XANAX) 0.5 mg tablet Take 1 tablet (0.5 mg total) by mouth daily at bedtime as needed for anxiety or sleep (Patient not taking: Reported on 7/12/2023) 30 tablet 0   • Fluticasone-Salmeterol (Advair) 250-50 mcg/dose inhaler Inhale 1 puff 2 (two) times a day Rinse mouth after use. (Patient not taking: Reported on 7/12/2023) 60 blister 5     No current facility-administered medications for this visit. Objective:    /76 (BP Location: Left arm, Patient Position: Sitting, Cuff Size: Large)   Pulse 80   Temp 98.4 °F (36.9 °C)   Resp 18   Ht 5' 11" (1.803 m)   Wt 75.5 kg (166 lb 6.4 oz)   SpO2 96%   BMI 23.21 kg/m²        Physical Exam  Constitutional:       General: He is not in acute distress. Appearance: He is not ill-appearing. HENT:      Right Ear: Tympanic membrane and ear canal normal.      Left Ear: Tympanic membrane and ear canal normal.      Nose: Congestion present. No rhinorrhea. Mouth/Throat:      Pharynx: No oropharyngeal exudate or posterior oropharyngeal erythema. Cardiovascular:      Rate and Rhythm: Normal rate. Pulmonary:      Effort: Pulmonary effort is normal. No respiratory distress. Breath sounds: No wheezing, rhonchi or rales. Neurological:      Mental Status: He is alert. Assessment/Plan:         Diagnoses and all orders for this visit:    URI, acute  -     azithromycin (ZITHROMAX) 250 mg tablet;  Take 2 tablets today then 1 tablet daily x 4 days    Acute cough  -     POCT Rapid Covid Ag  -  Negative         rto prn                     Presley Diop MD

## 2023-07-19 NOTE — LETTER
July 19, 2023     Patient: Lucia Campos  YOB: 1980  Date of Visit: 7/19/2023      To Whom it May Concern:    Jered Nuñez is under my professional care. Ele Stacy was seen in my office on 7/19/2023. Ele Stacy may return to work on 7/24/23 . Pt was out of work from 7/17/23    If you have any questions or concerns, please don't hesitate to call.          Sincerely,          Zain Lima MD        CC: No Recipients

## 2023-08-04 ENCOUNTER — HOSPITAL ENCOUNTER (OUTPATIENT)
Dept: PULMONOLOGY | Facility: HOSPITAL | Age: 43
End: 2023-08-04
Payer: COMMERCIAL

## 2023-08-04 DIAGNOSIS — R05.3 CHRONIC COUGH: ICD-10-CM

## 2023-08-04 PROCEDURE — 94010 BREATHING CAPACITY TEST: CPT | Performed by: INTERNAL MEDICINE

## 2023-08-04 PROCEDURE — 94726 PLETHYSMOGRAPHY LUNG VOLUMES: CPT | Performed by: INTERNAL MEDICINE

## 2023-08-04 PROCEDURE — 94729 DIFFUSING CAPACITY: CPT

## 2023-08-04 PROCEDURE — 94729 DIFFUSING CAPACITY: CPT | Performed by: INTERNAL MEDICINE

## 2023-08-04 PROCEDURE — 94760 N-INVAS EAR/PLS OXIMETRY 1: CPT

## 2023-08-04 PROCEDURE — 94010 BREATHING CAPACITY TEST: CPT

## 2023-08-04 PROCEDURE — 94726 PLETHYSMOGRAPHY LUNG VOLUMES: CPT

## 2023-08-07 ENCOUNTER — TELEPHONE (OUTPATIENT)
Dept: FAMILY MEDICINE CLINIC | Facility: CLINIC | Age: 43
End: 2023-08-07

## 2023-09-18 ENCOUNTER — OFFICE VISIT (OUTPATIENT)
Dept: FAMILY MEDICINE CLINIC | Facility: CLINIC | Age: 43
End: 2023-09-18
Payer: COMMERCIAL

## 2023-09-18 VITALS
RESPIRATION RATE: 16 BRPM | WEIGHT: 170 LBS | BODY MASS INDEX: 23.8 KG/M2 | HEART RATE: 76 BPM | DIASTOLIC BLOOD PRESSURE: 82 MMHG | TEMPERATURE: 98.4 F | SYSTOLIC BLOOD PRESSURE: 138 MMHG | HEIGHT: 71 IN

## 2023-09-18 DIAGNOSIS — M25.561 ACUTE PAIN OF BOTH KNEES: ICD-10-CM

## 2023-09-18 DIAGNOSIS — M25.562 ACUTE PAIN OF BOTH KNEES: ICD-10-CM

## 2023-09-18 DIAGNOSIS — L98.9 SKIN LESION OF SCALP: Primary | ICD-10-CM

## 2023-09-18 PROCEDURE — 99214 OFFICE O/P EST MOD 30 MIN: CPT | Performed by: FAMILY MEDICINE

## 2023-09-18 NOTE — PROGRESS NOTES
Chief Complaint   Patient presents with   • Rash     On temp of head that started as just and now there are 3 small spots , also a spot on his right ear   • Knee Pain     He would like a referral to orthopedic , he started running and his knees hurt . Also need a medical release form signed for the [employer]         Patient ID: Ramón Marcus is a 43 y.o. male. Knee Pain   There was no injury mechanism. The pain is present in the left knee and right knee. The quality of the pain is described as aching. The pain is mild. The pain has been intermittent since onset. Pertinent negatives include no inability to bear weight, loss of motion, loss of sensation, muscle weakness, numbness or tingling. He has tried nothing for the symptoms. The treatment provided no relief.    pain with changing positions from sitting to standing  -  No issues when walking       The following portions of the patient's history were reviewed and updated as appropriate: allergies, current medications, past family history, past medical history, past social history, past surgical history and problem list.    Review of Systems   Constitutional: Negative. Respiratory: Negative. Cardiovascular: Negative. Gastrointestinal: Negative. Genitourinary: Negative. Skin:        Skin lesions as above   Neurological: Negative. Negative for tingling and numbness. Current Outpatient Medications   Medication Sig Dispense Refill   • Cholecalciferol (VITAMIN D) 2000 units CAPS Take 1 capsule by mouth daily     • multivitamin (THERAGRAN) TABS Take 1 tablet by mouth daily     • vitamin B-12 (VITAMIN B-12) 1,000 mcg tablet Take by mouth daily     • ALPRAZolam (XANAX) 0.5 mg tablet Take 1 tablet (0.5 mg total) by mouth daily at bedtime as needed for anxiety or sleep (Patient not taking: Reported on 7/12/2023) 30 tablet 0   • Fluticasone-Salmeterol (Advair) 250-50 mcg/dose inhaler Inhale 1 puff 2 (two) times a day Rinse mouth after use. (Patient not taking: Reported on 7/12/2023) 60 blister 5   • montelukast (SINGULAIR) 10 mg tablet Take 1 tablet (10 mg total) by mouth daily at bedtime (Patient not taking: Reported on 9/18/2023) 30 tablet 1     No current facility-administered medications for this visit. Objective:    /82 (BP Location: Left arm, Patient Position: Sitting, Cuff Size: Large)   Pulse 76   Temp 98.4 °F (36.9 °C)   Resp 16   Ht 5' 11" (1.803 m)   Wt 77.1 kg (170 lb)   BMI 23.71 kg/m²        Physical Exam  Constitutional:       General: He is not in acute distress. Appearance: He is not ill-appearing. Cardiovascular:      Rate and Rhythm: Normal rate. Pulmonary:      Effort: Pulmonary effort is normal. No respiratory distress. Musculoskeletal:         General: No swelling or tenderness. Right knee: Normal.      Left knee: Normal.      Right lower leg: No edema. Left lower leg: No edema. Neurological:      Mental Status: He is alert. Assessment/Plan:         Diagnoses and all orders for this visit:    Skin lesion of scalp  -     Ambulatory Referral to Dermatology; Future    Acute pain of both knees  -     XR knee 3 vw right non injury; Future  -     XR knee 3 vw left non injury; Future  -     Ambulatory Referral to Orthopedic Surgery;  Future      rto nettie Moore MD

## 2023-09-19 ENCOUNTER — HOSPITAL ENCOUNTER (OUTPATIENT)
Dept: RADIOLOGY | Facility: HOSPITAL | Age: 43
Discharge: HOME | End: 2023-09-19
Payer: COMMERCIAL

## 2023-09-19 DIAGNOSIS — M25.561 ACUTE PAIN OF BOTH KNEES: ICD-10-CM

## 2023-09-19 DIAGNOSIS — M25.562 ACUTE PAIN OF BOTH KNEES: ICD-10-CM

## 2023-09-19 PROCEDURE — 73562 X-RAY EXAM OF KNEE 3: CPT

## 2023-09-22 ENCOUNTER — OFFICE VISIT (OUTPATIENT)
Dept: OBGYN CLINIC | Facility: CLINIC | Age: 43
End: 2023-09-22
Payer: COMMERCIAL

## 2023-09-22 ENCOUNTER — OFFICE VISIT (OUTPATIENT)
Dept: PLASTIC SURGERY | Facility: CLINIC | Age: 43
End: 2023-09-22

## 2023-09-22 VITALS
TEMPERATURE: 95.8 F | HEIGHT: 71 IN | SYSTOLIC BLOOD PRESSURE: 135 MMHG | BODY MASS INDEX: 23.52 KG/M2 | WEIGHT: 168 LBS | HEART RATE: 74 BPM | DIASTOLIC BLOOD PRESSURE: 94 MMHG

## 2023-09-22 VITALS — WEIGHT: 168 LBS | HEIGHT: 71 IN | BODY MASS INDEX: 23.52 KG/M2

## 2023-09-22 DIAGNOSIS — M22.2X1 PATELLOFEMORAL PAIN SYNDROME OF BOTH KNEES: ICD-10-CM

## 2023-09-22 DIAGNOSIS — L82.1 SEBORRHEIC KERATOSIS OF SCALP: Primary | ICD-10-CM

## 2023-09-22 DIAGNOSIS — M22.2X2 PATELLOFEMORAL PAIN SYNDROME OF BOTH KNEES: ICD-10-CM

## 2023-09-22 PROCEDURE — 99243 OFF/OP CNSLTJ NEW/EST LOW 30: CPT | Performed by: ORTHOPAEDIC SURGERY

## 2023-09-22 NOTE — PROGRESS NOTES
Assessment/Plan:  1. Patellofemoral pain syndrome of both knees  Ambulatory Referral to Orthopedic Surgery    Ambulatory referral to Physical Therapy          Scooter Gillespie has bilateral knee pain consistent with patellofemoral syndrome. He does have increased patellar laxity bilaterally this does preclude him to having more patellofemoral knee pain. I recommended formal physical therapy for quadricep strengthening at this time. We could consider patellar stabilizing knee braces in the future. He agreed with this plan and will follow-up with me after therapy if his pain persists or worsens. Subjective:   Lyndsey Suarez is a 43 y.o. male who presents to the office for evaluation for bilateral knee pain. He was referred by his PCP office. He states he has a history of bilateral knee pain that bothered him when he was in the [address] and doing a lot of running. He states it has not bothered him as he has been less active and recently he started becoming more active with increased running and has felt the pain again. He describes an intermittent aching throbbing pain over the anterior aspect of both knees. It seems to worsen after running. It does bother him with squatting after he runs. If he is less active it does not bother him. He denies any swelling, instability or locking of his knees. Review of Systems   Constitutional: Negative for chills, fever and unexpected weight change. HENT: Negative for hearing loss, nosebleeds and sore throat. Eyes: Negative for pain, redness and visual disturbance. Respiratory: Negative for cough, shortness of breath and wheezing. Cardiovascular: Negative for chest pain, palpitations and leg swelling. Gastrointestinal: Negative for abdominal pain, nausea and vomiting. Endocrine: Negative for polyphagia and polyuria. Genitourinary: Negative for dysuria and hematuria. Musculoskeletal:        See HPI   Skin: Negative for rash and wound.    Neurological: Negative for dizziness, numbness and headaches. Psychiatric/Behavioral: Negative for decreased concentration and suicidal ideas. The patient is not nervous/anxious.           Past Medical History:   Diagnosis Date   • Cervical disc herniation     2014 per [employer]       Past Surgical History:   Procedure Laterality Date   • HERNIA REPAIR         Family History   Problem Relation Age of Onset   • Hyperlipidemia Mother    • Hypertension Mother    • Atrial fibrillation Father    • COPD Maternal Grandmother    • Emphysema Maternal Grandmother    • Uveitis Maternal Grandmother    • Aortic aneurysm Maternal Grandmother    • No Known Problems Sister    • No Known Problems Brother    • No Known Problems Maternal Aunt    • No Known Problems Maternal Uncle    • No Known Problems Paternal Aunt    • No Known Problems Paternal Uncle    • No Known Problems Maternal Grandfather    • No Known Problems Paternal Grandmother    • No Known Problems Paternal Grandfather    • ADD / ADHD Neg Hx    • Anesthesia problems Neg Hx    • Cancer Neg Hx    • Clotting disorder Neg Hx    • Collagen disease Neg Hx    • Diabetes Neg Hx    • Dislocations Neg Hx    • Learning disabilities Neg Hx    • Neurological problems Neg Hx    • Osteoporosis Neg Hx    • Rheumatologic disease Neg Hx    • Scoliosis Neg Hx    • Vascular Disease Neg Hx        Social History     Occupational History   • Not on file   Tobacco Use   • Smoking status: Former     Types: Cigarettes     Start date: 1993     Quit date: 2010     Years since quittin.8   • Smokeless tobacco: Never   Vaping Use   • Vaping Use: Never used   Substance and Sexual Activity   • Alcohol use: No   • Drug use: No   • Sexual activity: Yes     Partners: Female         Current Outpatient Medications:   •  ALPRAZolam (XANAX) 0.5 mg tablet, Take 1 tablet (0.5 mg total) by mouth daily at bedtime as needed for anxiety or sleep (Patient not taking: Reported on 2023), Disp: 30 tablet, Rfl: 0  •  Cholecalciferol (VITAMIN D) 2000 units CAPS, Take 1 capsule by mouth daily, Disp: , Rfl:   •  Fluticasone-Salmeterol (Advair) 250-50 mcg/dose inhaler, Inhale 1 puff 2 (two) times a day Rinse mouth after use. (Patient not taking: Reported on 7/12/2023), Disp: 60 blister, Rfl: 5  •  montelukast (SINGULAIR) 10 mg tablet, Take 1 tablet (10 mg total) by mouth daily at bedtime (Patient not taking: Reported on 9/18/2023), Disp: 30 tablet, Rfl: 1  •  multivitamin (THERAGRAN) TABS, Take 1 tablet by mouth daily, Disp: , Rfl:   •  vitamin B-12 (VITAMIN B-12) 1,000 mcg tablet, Take by mouth daily, Disp: , Rfl:     Allergies   Allergen Reactions   • Tilactase Other (See Comments) and GI Intolerance     Skin issues        Objective:  Vitals:            Right Knee Exam     Tenderness   The patient is experiencing tenderness in the patella. Range of Motion   Extension: normal   Flexion: normal     Tests   Roly:  Medial - negative Lateral - negative  Varus: negative Valgus: negative    Other   Erythema: absent  Sensation: normal  Pulse: present  Swelling: none  Effusion: no effusion present    Comments:  Positive patellar grind test  Patellar laxity bilaterally      Left Knee Exam     Tenderness   The patient is experiencing tenderness in the patella. Range of Motion   Extension: normal   Flexion: normal     Tests   Roly:  Medial - negative Lateral - negative  Varus: negative Valgus: negative    Other   Erythema: absent  Sensation: normal  Pulse: present  Swelling: none  Effusion: no effusion present    Comments:  Positive patellar grind test          Observations   Left Knee   Negative for effusion. Right Knee   Negative for effusion. Physical Exam  Vitals and nursing note reviewed. Constitutional:       Appearance: Normal appearance. He is well-developed. HENT:      Head: Normocephalic and atraumatic.       Right Ear: External ear normal.      Left Ear: External ear normal.      Nose: Nose normal.   Eyes:      General: No scleral icterus. Extraocular Movements: Extraocular movements intact. Conjunctiva/sclera: Conjunctivae normal.   Cardiovascular:      Rate and Rhythm: Normal rate. Pulmonary:      Effort: Pulmonary effort is normal. No respiratory distress. Musculoskeletal:      Cervical back: Normal range of motion and neck supple. Right knee: No effusion. Instability Tests: Medial Roly test negative and lateral Roly test negative. Left knee: No effusion. Instability Tests: Medial Roly test negative and lateral Roly test negative. Comments: See Ortho exam   Skin:     General: Skin is warm and dry. Neurological:      General: No focal deficit present. Mental Status: He is alert and oriented to person, place, and time. Psychiatric:         Behavior: Behavior normal.         I have personally reviewed pertinent films in PACS and my interpretation is as follows:  Bilateral knee x-rays demonstrate no evidence of acute fracture or significant degenerative change. This document was created using speech voice recognition software. Grammatical errors, random word insertions, pronoun errors, and incomplete sentences are an occasional consequence of this system due to software limitations, ambient noise, and hardware issues. Any formal questions or concerns about content, text, or information contained within the body of this dictation should be directly addressed to the provider for clarification.

## 2023-09-22 NOTE — LETTER
September 22, 2023     [facility]    Patient: Wan Man   YOB: 1980   Date of Visit: 9/22/2023       Dear Dr. Danisha Barrow: Thank you for referring Arleth Lawson to me for evaluation. Below are my notes for this consultation. If you have questions, please do not hesitate to call me. I look forward to following your patient along with you. Sincerely,        Alexander Brown DO        CC: No Recipients    Alexander Brown DO  9/22/2023  3:09 PM  Sign when Signing Visit  Assessment/Plan:  1. Patellofemoral pain syndrome of both knees  Ambulatory Referral to Orthopedic Surgery    Ambulatory referral to Physical Therapy          Maria Luz Hernández has bilateral knee pain consistent with patellofemoral syndrome. He does have increased patellar laxity bilaterally this does preclude him to having more patellofemoral knee pain. I recommended formal physical therapy for quadricep strengthening at this time. We could consider patellar stabilizing knee braces in the future. He agreed with this plan and will follow-up with me after therapy if his pain persists or worsens. Subjective:   Wan Man is a 43 y.o. male who presents to the office for evaluation for bilateral knee pain. He was referred by his PCP office. He states he has a history of bilateral knee pain that bothered him when he was in the [employer] and doing a lot of running. He states it has not bothered him as he has been less active and recently he started becoming more active with increased running and has felt the pain again. He describes an intermittent aching throbbing pain over the anterior aspect of both knees. It seems to worsen after running. It does bother him with squatting after he runs. If he is less active it does not bother him. He denies any swelling, instability or locking of his knees.       Review of Systems   Constitutional: Negative for chills, fever and unexpected weight change. HENT: Negative for hearing loss, nosebleeds and sore throat. Eyes: Negative for pain, redness and visual disturbance. Respiratory: Negative for cough, shortness of breath and wheezing. Cardiovascular: Negative for chest pain, palpitations and leg swelling. Gastrointestinal: Negative for abdominal pain, nausea and vomiting. Endocrine: Negative for polyphagia and polyuria. Genitourinary: Negative for dysuria and hematuria. Musculoskeletal:        See HPI   Skin: Negative for rash and wound. Neurological: Negative for dizziness, numbness and headaches. Psychiatric/Behavioral: Negative for decreased concentration and suicidal ideas. The patient is not nervous/anxious.           Past Medical History:   Diagnosis Date   • Cervical disc herniation     6/2/2014 per [employer]       Past Surgical History:   Procedure Laterality Date   • HERNIA REPAIR         Family History   Problem Relation Age of Onset   • Hyperlipidemia Mother    • Hypertension Mother    • Atrial fibrillation Father    • COPD Maternal Grandmother    • Emphysema Maternal Grandmother    • Uveitis Maternal Grandmother    • Aortic aneurysm Maternal Grandmother    • No Known Problems Sister    • No Known Problems Brother    • No Known Problems Maternal Aunt    • No Known Problems Maternal Uncle    • No Known Problems Paternal Aunt    • No Known Problems Paternal Uncle    • No Known Problems Maternal Grandfather    • No Known Problems Paternal Grandmother    • No Known Problems Paternal Grandfather    • ADD / ADHD Neg Hx    • Anesthesia problems Neg Hx    • Cancer Neg Hx    • Clotting disorder Neg Hx    • Collagen disease Neg Hx    • Diabetes Neg Hx    • Dislocations Neg Hx    • Learning disabilities Neg Hx    • Neurological problems Neg Hx    • Osteoporosis Neg Hx    • Rheumatologic disease Neg Hx    • Scoliosis Neg Hx    • Vascular Disease Neg Hx        Social History     Occupational History   • Not on file Tobacco Use   • Smoking status: Former     Types: Cigarettes     Start date: 1993     Quit date: 2010     Years since quittin.8   • Smokeless tobacco: Never   Vaping Use   • Vaping Use: Never used   Substance and Sexual Activity   • Alcohol use: No   • Drug use: No   • Sexual activity: Yes     Partners: Female         Current Outpatient Medications:   •  ALPRAZolam (XANAX) 0.5 mg tablet, Take 1 tablet (0.5 mg total) by mouth daily at bedtime as needed for anxiety or sleep (Patient not taking: Reported on 2023), Disp: 30 tablet, Rfl: 0  •  Cholecalciferol (VITAMIN D) 2000 units CAPS, Take 1 capsule by mouth daily, Disp: , Rfl:   •  Fluticasone-Salmeterol (Advair) 250-50 mcg/dose inhaler, Inhale 1 puff 2 (two) times a day Rinse mouth after use. (Patient not taking: Reported on 2023), Disp: 60 blister, Rfl: 5  •  montelukast (SINGULAIR) 10 mg tablet, Take 1 tablet (10 mg total) by mouth daily at bedtime (Patient not taking: Reported on 2023), Disp: 30 tablet, Rfl: 1  •  multivitamin (THERAGRAN) TABS, Take 1 tablet by mouth daily, Disp: , Rfl:   •  vitamin B-12 (VITAMIN B-12) 1,000 mcg tablet, Take by mouth daily, Disp: , Rfl:     Allergies   Allergen Reactions   • Tilactase Other (See Comments) and GI Intolerance     Skin issues        Objective:  Vitals:            Right Knee Exam     Tenderness   The patient is experiencing tenderness in the patella. Range of Motion   Extension: normal   Flexion: normal     Tests   Roly:  Medial - negative Lateral - negative  Varus: negative Valgus: negative    Other   Erythema: absent  Sensation: normal  Pulse: present  Swelling: none  Effusion: no effusion present    Comments:  Positive patellar grind test  Patellar laxity bilaterally      Left Knee Exam     Tenderness   The patient is experiencing tenderness in the patella.     Range of Motion   Extension: normal   Flexion: normal     Tests   Roly:  Medial - negative Lateral - negative  Varus: negative Valgus: negative    Other   Erythema: absent  Sensation: normal  Pulse: present  Swelling: none  Effusion: no effusion present    Comments:  Positive patellar grind test          Observations   Left Knee   Negative for effusion. Right Knee   Negative for effusion. Physical Exam  Vitals and nursing note reviewed. Constitutional:       Appearance: Normal appearance. He is well-developed. HENT:      Head: Normocephalic and atraumatic. Right Ear: External ear normal.      Left Ear: External ear normal.      Nose: Nose normal.   Eyes:      General: No scleral icterus. Extraocular Movements: Extraocular movements intact. Conjunctiva/sclera: Conjunctivae normal.   Cardiovascular:      Rate and Rhythm: Normal rate. Pulmonary:      Effort: Pulmonary effort is normal. No respiratory distress. Musculoskeletal:      Cervical back: Normal range of motion and neck supple. Right knee: No effusion. Instability Tests: Medial Roly test negative and lateral Roly test negative. Left knee: No effusion. Instability Tests: Medial Roly test negative and lateral Roly test negative. Comments: See Ortho exam   Skin:     General: Skin is warm and dry. Neurological:      General: No focal deficit present. Mental Status: He is alert and oriented to person, place, and time. Psychiatric:         Behavior: Behavior normal.         I have personally reviewed pertinent films in PACS and my interpretation is as follows:  Bilateral knee x-rays demonstrate no evidence of acute fracture or significant degenerative change. This document was created using speech voice recognition software. Grammatical errors, random word insertions, pronoun errors, and incomplete sentences are an occasional consequence of this system due to software limitations, ambient noise, and hardware issues.    Any formal questions or concerns about content, text, or information contained within the body of this dictation should be directly addressed to the provider for clarification.

## 2023-10-09 NOTE — PROGRESS NOTES
[address] [facility]  [address] [place] [address] VINI Sidhu [place]stad  [phone]    Patient Identification: Maryana Pickett is a 43 y.o. male     History of Present Illness: The patient is a 43y.o.  year-old male  who presents to the office for a new patient consult regarding a facial lesion. Patient states the lesion is located on the right cheek near the hairline. He states the lesion appeared within the recent months. It is asymptomatic, denying pain, itching, bleeding, scabbing or fluid discharge. He denies a personal history of skin cancer. Patient has no other complaints at this time. Past Medical History:   Diagnosis Date   • Cervical disc herniation     6/2/2014 per [employer]        Patient Active Problem List   Diagnosis   • Vitamin D deficiency   • Screening for cardiovascular condition   • Screening for diabetes mellitus   • Vegan        Past Surgical History:   Procedure Laterality Date   • HERNIA REPAIR          Allergies   Allergen Reactions   • Tilactase Other (See Comments) and GI Intolerance     Skin issues         Current Outpatient Medications on File Prior to Visit   Medication Sig Dispense Refill   • ALPRAZolam (XANAX) 0.5 mg tablet Take 1 tablet (0.5 mg total) by mouth daily at bedtime as needed for anxiety or sleep (Patient not taking: Reported on 7/12/2023) 30 tablet 0   • Cholecalciferol (VITAMIN D) 2000 units CAPS Take 1 capsule by mouth daily     • Fluticasone-Salmeterol (Advair) 250-50 mcg/dose inhaler Inhale 1 puff 2 (two) times a day Rinse mouth after use.  (Patient not taking: Reported on 7/12/2023) 60 blister 5   • montelukast (SINGULAIR) 10 mg tablet Take 1 tablet (10 mg total) by mouth daily at bedtime (Patient not taking: Reported on 9/18/2023) 30 tablet 1   • multivitamin (THERAGRAN) TABS Take 1 tablet by mouth daily     • vitamin B-12 (VITAMIN B-12) 1,000 mcg tablet Take by mouth daily       No current facility-administered medications on file prior to visit. Tobacco Use: Medium Risk (9/22/2023)    Patient History    • Smoking Tobacco Use: Former    • Smokeless Tobacco Use: Never    • Passive Exposure: Not on file      Review of Systems  Constitutional: Denies fevers, chills or pain  Skin: Denies any warmth, acute edema, erythema, or mucopurulent drainage. Physical Exam   Vitals:    09/22/23 0838   BP: 135/94   Pulse: 74   Temp: (!) 95.8 °F (35.4 °C)     Constitutional:AAOx3, well-developed, no distress. Pt is cooperative and pleasant. Eyes: Pupils are equal, round, and reactive to light. EOM in tact. Clear conjunctiva  Cardiovascular: Normal rate, regular rhythm. Pulmonary/Chest: Effort normal and breath sounds normal.   Neuro: Gait in tact. Reflexes and motor strength normal and symmetric. Cranial nerved 2-12 grossly intact  Psychiatric: Has appropriate behavior and thought process. Extremities: Full ROM, no gross abnormalities. Skin: lesion on right temple measuring 0.7x0.5cm. flat, waxy, tan-brown pigment. No telangiectasias or ulcerations. Similar lesion to the medial left within the hairline. Assessment and Plan:  The patient is an 43y.o.  year-old male who presents to the office for a new patient consult regarding a facial lesion.    -At today's visit discussed with the patient the lesion presents as seborrheic keratosis. Discussed benign nature. All questions were answered.  -Encouraged dermatology for follow up and to begin yearly skin examinations. Pt may also discussed cryotherapy removal at that time if he is a candidate.   -The patient is to return as needed for future questions or concerns. - The patient is to call the office with any questions or concerns. All of the patient's questions were answered at this time and they agree with the plan of care.       Babar Parker PA-C  [facility]

## 2023-11-07 ENCOUNTER — HOSPITAL ENCOUNTER (EMERGENCY)
Facility: HOSPITAL | Age: 43
Discharge: HOME | End: 2023-11-07
Attending: EMERGENCY MEDICINE
Payer: COMMERCIAL

## 2023-11-07 VITALS
RESPIRATION RATE: 18 BRPM | TEMPERATURE: 97.5 F | SYSTOLIC BLOOD PRESSURE: 151 MMHG | OXYGEN SATURATION: 100 % | HEART RATE: 89 BPM | DIASTOLIC BLOOD PRESSURE: 94 MMHG

## 2023-11-07 DIAGNOSIS — M54.9 BACK PAIN: Primary | ICD-10-CM

## 2023-11-07 PROCEDURE — 99282 EMERGENCY DEPT VISIT SF MDM: CPT

## 2023-11-07 RX ORDER — ACETAMINOPHEN 325 MG/1
650 TABLET ORAL ONCE
Status: DISCONTINUED | OUTPATIENT
Start: 2023-11-07 | End: 2023-11-08 | Stop reason: HOSPADM

## 2023-11-07 RX ORDER — METHOCARBAMOL 500 MG/1
500 TABLET, FILM COATED ORAL ONCE
Status: DISCONTINUED | OUTPATIENT
Start: 2023-11-07 | End: 2023-11-08 | Stop reason: HOSPADM

## 2023-11-07 RX ORDER — IBUPROFEN 600 MG/1
600 TABLET ORAL ONCE
Status: DISCONTINUED | OUTPATIENT
Start: 2023-11-07 | End: 2023-11-08 | Stop reason: HOSPADM

## 2023-11-07 RX ORDER — LIDOCAINE 50 MG/G
1 PATCH TOPICAL ONCE
Status: DISCONTINUED | OUTPATIENT
Start: 2023-11-07 | End: 2023-11-08 | Stop reason: HOSPADM

## 2023-11-08 ENCOUNTER — APPOINTMENT (OUTPATIENT)
Dept: RADIOLOGY | Facility: CLINIC | Age: 43
End: 2023-11-08
Payer: COMMERCIAL

## 2023-11-08 ENCOUNTER — OFFICE VISIT (OUTPATIENT)
Dept: FAMILY MEDICINE CLINIC | Facility: CLINIC | Age: 43
End: 2023-11-08
Payer: COMMERCIAL

## 2023-11-08 VITALS
BODY MASS INDEX: 23.94 KG/M2 | WEIGHT: 171 LBS | TEMPERATURE: 98.2 F | HEIGHT: 71 IN | RESPIRATION RATE: 14 BRPM | SYSTOLIC BLOOD PRESSURE: 120 MMHG | OXYGEN SATURATION: 97 % | HEART RATE: 72 BPM | DIASTOLIC BLOOD PRESSURE: 80 MMHG

## 2023-11-08 DIAGNOSIS — R10.9 ACUTE LEFT FLANK PAIN: Primary | ICD-10-CM

## 2023-11-08 DIAGNOSIS — R10.9 ACUTE LEFT FLANK PAIN: ICD-10-CM

## 2023-11-08 LAB
SL AMB  POCT GLUCOSE, UA: ABNORMAL
SL AMB LEUKOCYTE ESTERASE,UA: ABNORMAL
SL AMB POCT BILIRUBIN,UA: ABNORMAL
SL AMB POCT BLOOD,UA: 50
SL AMB POCT CLARITY,UA: CLEAR
SL AMB POCT COLOR,UA: YELLOW
SL AMB POCT KETONES,UA: ABNORMAL
SL AMB POCT NITRITE,UA: ABNORMAL
SL AMB POCT PH,UA: 6.5
SL AMB POCT SPECIFIC GRAVITY,UA: 1.01
SL AMB POCT URINE PROTEIN: ABNORMAL
SL AMB POCT UROBILINOGEN: ABNORMAL

## 2023-11-08 PROCEDURE — 81003 URINALYSIS AUTO W/O SCOPE: CPT | Performed by: FAMILY MEDICINE

## 2023-11-08 PROCEDURE — 74018 RADEX ABDOMEN 1 VIEW: CPT

## 2023-11-08 PROCEDURE — 99214 OFFICE O/P EST MOD 30 MIN: CPT | Performed by: FAMILY MEDICINE

## 2023-11-08 NOTE — PROGRESS NOTES
Chief Complaint   Patient presents with   • Follow-up     Went to ER for left side back pain couldn't stand up straight last night        Patient ID: Trace Bowman is a 43 y.o. male. HPI  Pt is seeing for episode for severe 10/10 acute L flank pain when washing dishes -  went to ER -  had nausea and sweats with pain - almost resolved when went to ER  -  no tests done since was feeling so much better -  no  issues -  no prior h/o kidney stones  -  feeling slight discomfort now     The following portions of the patient's history were reviewed and updated as appropriate: allergies, current medications, past family history, past medical history, past social history, past surgical history and problem list.    Review of Systems   Constitutional: Negative. Respiratory: Negative. Cardiovascular: Negative. Gastrointestinal: Negative. Genitourinary: Negative. Skin: Negative. Neurological: Negative. Current Outpatient Medications   Medication Sig Dispense Refill   • Cholecalciferol (VITAMIN D) 2000 units CAPS Take 1 capsule by mouth daily     • multivitamin (THERAGRAN) TABS Take 1 tablet by mouth daily     • ALPRAZolam (XANAX) 0.5 mg tablet Take 1 tablet (0.5 mg total) by mouth daily at bedtime as needed for anxiety or sleep (Patient not taking: Reported on 7/12/2023) 30 tablet 0   • Fluticasone-Salmeterol (Advair) 250-50 mcg/dose inhaler Inhale 1 puff 2 (two) times a day Rinse mouth after use. (Patient not taking: Reported on 7/12/2023) 60 blister 5   • montelukast (SINGULAIR) 10 mg tablet Take 1 tablet (10 mg total) by mouth daily at bedtime (Patient not taking: Reported on 9/18/2023) 30 tablet 1   • vitamin B-12 (VITAMIN B-12) 1,000 mcg tablet Take by mouth daily (Patient not taking: Reported on 11/8/2023)       No current facility-administered medications for this visit.        Objective:    /80 (BP Location: Left arm, Patient Position: Sitting, Cuff Size: Adult)   Pulse 72   Temp 98.2 °F (36.8 °C) (Temporal)   Resp 14   Ht 5' 11" (1.803 m)   Wt 77.6 kg (171 lb)   SpO2 97%   BMI 23.85 kg/m²        Physical Exam  Constitutional:       General: He is not in acute distress. Appearance: He is not ill-appearing. Cardiovascular:      Rate and Rhythm: Normal rate. Pulmonary:      Effort: Pulmonary effort is normal.   Abdominal:      Palpations: Abdomen is soft. Tenderness: There is no abdominal tenderness. There is no right CVA tenderness or left CVA tenderness. Musculoskeletal:      Lumbar back: No spasms or tenderness. Normal range of motion. Skin:     Findings: No rash. Neurological:      Mental Status: He is alert and oriented to person, place, and time. Recent Results (from the past 672 hour(s))   POCT urine dip auto non-scope    Collection Time: 11/08/23  9:56 AM   Result Value Ref Range     COLOR,UA yellow     CLARITY,UA clear     SPECIFIC GRAVITY,UA 1.010      PH,UA 6.5     LEUKOCYTE ESTERASE,UA neg     NITRITE,UA neg     GLUCOSE, UA norm     KETONES,UA neg     BILIRUBIN,UA neg     BLOOD,UA 50     POCT URINE PROTEIN neg     SL AMB POCT UROBILINOGEN norm           Assessment/Plan:         Diagnoses and all orders for this visit:    Acute left flank pain  -     POCT urine dip auto non-scope  -     XR abdomen 1 view kub; Future  -     US kidney and bladder;  Future          Rto prn                   Ahsan Fajardo MD

## 2023-11-09 ENCOUNTER — HOSPITAL ENCOUNTER (OUTPATIENT)
Dept: RADIOLOGY | Facility: HOSPITAL | Age: 43
Discharge: HOME | End: 2023-11-09
Payer: COMMERCIAL

## 2023-11-09 DIAGNOSIS — R10.9 ACUTE LEFT FLANK PAIN: ICD-10-CM

## 2023-11-09 PROCEDURE — 76775 US EXAM ABDO BACK WALL LIM: CPT

## 2023-11-09 NOTE — ED PROVIDER NOTES
History  Chief Complaint   Patient presents with    Back Pain     Pt reports severe lower back pain while washing dishes, unable to stand during episode, approx 1 hr ago, pain has subsided upon ED arrival.       HPI  Patient is 80-year-old male with no seen past medical history presenting for concerns of lower back pain that began while washing dishes. Patient states he had a left lower back spasm that was so bad that he is unable to stand during the episode of the pain is since subsided. Patient denies any bowel or bladder incontinence, saddle anesthesia, lower extremity weakness. Patient denies any previous surgeries or back injuries. Patient states he was moving heavy furniture around all day today. Prior to Admission Medications   Prescriptions Last Dose Informant Patient Reported? Taking? ALPRAZolam (XANAX) 0.5 mg tablet   No No   Sig: Take 1 tablet (0.5 mg total) by mouth daily at bedtime as needed for anxiety or sleep   Patient not taking: Reported on 7/12/2023   Cholecalciferol (VITAMIN D) 2000 units CAPS   Yes No   Sig: Take 1 capsule by mouth daily   Fluticasone-Salmeterol (Advair) 250-50 mcg/dose inhaler   No No   Sig: Inhale 1 puff 2 (two) times a day Rinse mouth after use.    Patient not taking: Reported on 7/12/2023   montelukast (SINGULAIR) 10 mg tablet   No No   Sig: Take 1 tablet (10 mg total) by mouth daily at bedtime   Patient not taking: Reported on 9/18/2023   multivitamin (THERAGRAN) TABS   Yes No   Sig: Take 1 tablet by mouth daily   vitamin B-12 (VITAMIN B-12) 1,000 mcg tablet   Yes No   Sig: Take by mouth daily   Patient not taking: Reported on 11/8/2023      Facility-Administered Medications: None       Past Medical History:   Diagnosis Date    Cervical disc herniation     6/2/2014 per [facility]       Past Surgical History:   Procedure Laterality Date    HERNIA REPAIR         Family History   Problem Relation Age of Onset    Hyperlipidemia Mother     Hypertension Mother     Atrial fibrillation Father     COPD Maternal Grandmother     Emphysema Maternal Grandmother     Uveitis Maternal Grandmother     Aortic aneurysm Maternal Grandmother     No Known Problems Sister     No Known Problems Brother     No Known Problems Maternal Aunt     No Known Problems Maternal Uncle     No Known Problems Paternal Aunt     No Known Problems Paternal Uncle     No Known Problems Maternal Grandfather     No Known Problems Paternal Grandmother     No Known Problems Paternal Grandfather     ADD / ADHD Neg Hx     Anesthesia problems Neg Hx     Cancer Neg Hx     Clotting disorder Neg Hx     Collagen disease Neg Hx     Diabetes Neg Hx     Dislocations Neg Hx     Learning disabilities Neg Hx     Neurological problems Neg Hx     Osteoporosis Neg Hx     Rheumatologic disease Neg Hx     Scoliosis Neg Hx     Vascular Disease Neg Hx      I have reviewed and agree with the history as documented. E-Cigarette/Vaping    E-Cigarette Use Never User      E-Cigarette/Vaping Substances     Social History     Tobacco Use    Smoking status: Former     Types: Cigarettes     Start date: 1993     Quit date: 2010     Years since quittin.9    Smokeless tobacco: Never   Vaping Use    Vaping Use: Never used   Substance Use Topics    Alcohol use: No    Drug use: No        Review of Systems   Constitutional: Negative. HENT: Negative. Eyes: Negative. Respiratory: Negative. Cardiovascular: Negative. Gastrointestinal: Negative. Endocrine: Negative. Genitourinary: Negative. Musculoskeletal:  Positive for back pain. Skin: Negative. Allergic/Immunologic: Negative. Neurological: Negative. Hematological: Negative. Psychiatric/Behavioral: Negative.          Physical Exam  ED Triage Vitals [23]   Temperature Pulse Respirations Blood Pressure SpO2   97.5 °F (36.4 °C) 89 18 151/94 100 %      Temp src Heart Rate Source Patient Position - Orthostatic VS BP Location FiO2 (%)   -- -- -- -- -- Pain Score       1             Orthostatic Vital Signs  Vitals:    11/07/23 2023   BP: 151/94   Pulse: 89       Physical Exam  Vitals and nursing note reviewed. Constitutional:       Appearance: Normal appearance. He is normal weight. HENT:      Head: Normocephalic and atraumatic. Right Ear: Tympanic membrane, ear canal and external ear normal.      Left Ear: Tympanic membrane, ear canal and external ear normal.      Nose: Nose normal.      Mouth/Throat:      Mouth: Mucous membranes are moist.      Pharynx: Oropharynx is clear. Eyes:      Extraocular Movements: Extraocular movements intact. Conjunctiva/sclera: Conjunctivae normal.      Pupils: Pupils are equal, round, and reactive to light. Cardiovascular:      Rate and Rhythm: Normal rate and regular rhythm. Pulses: Normal pulses. Heart sounds: Normal heart sounds. Pulmonary:      Effort: Pulmonary effort is normal.      Breath sounds: Normal breath sounds. Abdominal:      General: Abdomen is flat. Bowel sounds are normal.      Palpations: Abdomen is soft. Musculoskeletal:         General: Normal range of motion. Cervical back: Normal range of motion and neck supple. Comments: Patient has no midline tenderness over lumbar spine, abdominal deformities or step-offs. Patient does have some mild tenderness palpation over the left SI joint. No palpable deformities. Pain does not radiate. Skin:     General: Skin is warm. Capillary Refill: Capillary refill takes less than 2 seconds. Neurological:      General: No focal deficit present. Mental Status: He is alert and oriented to person, place, and time. Psychiatric:         Mood and Affect: Mood normal.         Behavior: Behavior normal.         Thought Content:  Thought content normal.         Judgment: Judgment normal.         ED Medications  Medications - No data to display    Diagnostic Studies  Results Reviewed       None                   No orders to display         Procedures  Procedures      ED Course                             SBIRT 20yo+      Flowsheet Row Most Recent Value   Initial Alcohol Screen: US AUDIT-C     1. How often do you have a drink containing alcohol? 2 Filed at: 11/07/2023 2025   2. How many drinks containing alcohol do you have on a typical day you are drinking? 0 Filed at: 11/07/2023 2025   3a. Male UNDER 65: How often do you have five or more drinks on one occasion? 0 Filed at: 11/07/2023 2025   Audit-C Score 2 Filed at: 11/07/2023 2025   WILLIAM: How many times in the past year have you. .. Used an illegal drug or used a prescription medication for non-medical reasons? Never Filed at: 11/07/2023 2025                  Medical Decision Making  Patient is a 41-year-old male presenting for left lower back pain. DDx: Musculoskeletal back pain. Based on patient presentation physical exam findings, primary concern is for musculoskeletal back pain. Will treat symptomatically with multimodal analgesia. Return precautions given. Patient stands and agrees. Ready for discharge. Problems Addressed:  Back pain: acute illness or injury          Disposition  Final diagnoses:   Back pain     Time reflects when diagnosis was documented in both MDM as applicable and the Disposition within this note       Time User Action Codes Description Comment    11/7/2023 10:00 PM Estefani Davila Add [M54.9] Back pain           ED Disposition       ED Disposition   Discharge    Condition   Stable    Date/Time   Tue Nov 7, 2023 [address] discharge to home/self care.                    Follow-up Information       Follow up With Specialties Details Why Contact Info Additional [address] [facility] Emergency Department Emergency Medicine Go to  If symptoms worsen [address] [facility] Emergency Department, [address] [place] [address], [id number]   [phone]    Concha Jacob MD Family Medicine Go to  If symptoms worsen [facility]  [phone]               Discharge Medication List as of 11/7/2023 10:01 PM        CONTINUE these medications which have NOT CHANGED    Details   ALPRAZolam (XANAX) 0.5 mg tablet Take 1 tablet (0.5 mg total) by mouth daily at bedtime as needed for anxiety or sleep, Starting Mon 3/14/2022, Normal      Cholecalciferol (VITAMIN D) 2000 units CAPS Take 1 capsule by mouth daily, Historical Med      Fluticasone-Salmeterol (Advair) 250-50 mcg/dose inhaler Inhale 1 puff 2 (two) times a day Rinse mouth after use., Starting Tue 4/4/2023, Until Sun 10/1/2023, Normal      montelukast (SINGULAIR) 10 mg tablet Take 1 tablet (10 mg total) by mouth daily at bedtime, Starting Wed 7/12/2023, Normal      multivitamin (THERAGRAN) TABS Take 1 tablet by mouth daily, Historical Med      vitamin B-12 (VITAMIN B-12) 1,000 mcg tablet Take by mouth daily, Historical Med           No discharge procedures on file. PDMP Review       None             ED Provider  Attending physically available and evaluated Patsy Rizvi. I managed the patient along with the ED Attending.     Electronically Signed by           Georgianne Gosselin, MD  11/09/23 2352

## 2023-11-10 NOTE — ED ATTENDING ATTESTATION
11/7/2023  INoel DO, saw and evaluated the patient. I have discussed the patient with the resident/non-physician practitioner and agree with the resident's/non-physician practitioner's findings, Plan of Care, and MDM as documented in the resident's/non-physician practitioner's note, except where noted. All available labs and Radiology studies were reviewed. I was present for key portions of any procedure(s) performed by the resident/non-physician practitioner and I was immediately available to provide assistance. At this point I agree with the current assessment done in the Emergency Department. I have conducted an independent evaluation of this patient a history and physical is as follows:    51-year-old male with left flank pain. Started when standing up doing dishes 1 hour ago. No pain. Denies any hematuria denies any abdominal pain no red flag symptoms. Likely uncomplicated low back pain. Normal physical exam with normal reflexes and strength.   Differential includes muscular strain doubt kidney stone doubt cord involvement    ED Course         Critical Care Time  Procedures

## 2023-11-14 ENCOUNTER — TELEPHONE (OUTPATIENT)
Dept: FAMILY MEDICINE CLINIC | Facility: CLINIC | Age: 43
End: 2023-11-14

## 2023-11-14 NOTE — TELEPHONE ENCOUNTER
----- Message from Faustino Carrillo MD sent at 11/14/2023  4:39 PM EST -----  Pl, advise pt -  xray did not show kidney stones

## 2023-11-27 ENCOUNTER — TELEPHONE (OUTPATIENT)
Age: 43
End: 2023-11-27

## 2023-11-27 NOTE — TELEPHONE ENCOUNTER
Pt received results of test done 3 weeks ago via [facility]. However, pt had a question about the single  2 mm calculus. Pt needs a detailed explanation of what that is. Please contact pt and advise. Thank you for your help.

## 2023-12-06 NOTE — TELEPHONE ENCOUNTER
Called patient. He says his wife was able to speak with Dr Judd Henley regarding this matter and it has been addressed.

## 2023-12-06 NOTE — TELEPHONE ENCOUNTER
Unsure if Dr Xiomara Jenkins reached out to patient. Please make him aware she is out till Monday.  Len keep active in her bin

## 2024-02-02 ENCOUNTER — OFFICE VISIT (OUTPATIENT)
Dept: FAMILY MEDICINE CLINIC | Facility: CLINIC | Age: 44
End: 2024-02-02
Payer: COMMERCIAL

## 2024-02-02 VITALS
WEIGHT: 163.2 LBS | SYSTOLIC BLOOD PRESSURE: 118 MMHG | RESPIRATION RATE: 16 BRPM | DIASTOLIC BLOOD PRESSURE: 85 MMHG | HEART RATE: 99 BPM | BODY MASS INDEX: 22.76 KG/M2 | TEMPERATURE: 98.9 F

## 2024-02-02 DIAGNOSIS — R09.81 SINUS CONGESTION: Primary | ICD-10-CM

## 2024-02-02 PROCEDURE — 99213 OFFICE O/P EST LOW 20 MIN: CPT | Performed by: FAMILY MEDICINE

## 2024-02-02 RX ORDER — AZITHROMYCIN 250 MG/1
TABLET, FILM COATED ORAL
Qty: 6 TABLET | Refills: 0 | Status: SHIPPED | OUTPATIENT
Start: 2024-02-02 | End: 2024-02-06

## 2024-02-02 RX ORDER — METHYLPREDNISOLONE 4 MG/1
TABLET ORAL
Qty: 21 EACH | Refills: 0 | Status: SHIPPED | OUTPATIENT
Start: 2024-02-02

## 2024-02-02 NOTE — LETTER
February 2, 2024     Patient: Mayur Alba  YOB: 1980  Date of Visit: 2/2/2024      To Whom it May Concern:    Mayur Alba is under my professional care. Mayur was seen in my office on 2/2/2024. Mayur may return to work on 2/5/24 . Apt was out of work form 1/30/24    If you have any questions or concerns, please don't hesitate to call.         Sincerely,          Jamaica Srivastava MD        CC: No Recipients

## 2024-02-02 NOTE — PROGRESS NOTES
Chief Complaint   Patient presents with   • Cold Like Symptoms          Patient ID: Mayur Alba is a 43 y.o. male.    Sinus Problem  This is a new problem. The current episode started in the past 7 days. The problem is unchanged. There has been no fever. His pain is at a severity of 4/10. The pain is mild. Associated symptoms include chills, congestion, coughing (minimal), sinus pressure and a sore throat. Pertinent negatives include no diaphoresis, ear pain, headaches, hoarse voice, neck pain, shortness of breath, sneezing or swollen glands. Past treatments include oral decongestants. The treatment provided mild relief.         The following portions of the patient's history were reviewed and updated as appropriate: allergies, current medications, past family history, past medical history, past social history, past surgical history and problem list.    Review of Systems   Constitutional:  Positive for chills and fatigue. Negative for diaphoresis and fever.   HENT:  Positive for congestion, sinus pressure and sore throat. Negative for ear pain, hoarse voice and sneezing.    Respiratory:  Positive for cough (minimal). Negative for shortness of breath and wheezing.    Gastrointestinal: Negative.    Musculoskeletal:  Negative for neck pain.   Neurological:  Negative for headaches.       Current Outpatient Medications   Medication Sig Dispense Refill   • Cholecalciferol (VITAMIN D) 2000 units CAPS Take 1 capsule by mouth daily     • multivitamin (THERAGRAN) TABS Take 1 tablet by mouth daily     • vitamin B-12 (VITAMIN B-12) 1,000 mcg tablet Take by mouth daily (Patient not taking: Reported on 11/8/2023)       No current facility-administered medications for this visit.       Objective:    /85   Pulse 99   Temp 98.9 °F (37.2 °C)   Resp 16   Wt 74 kg (163 lb 3.2 oz)   BMI 22.76 kg/m²        Physical Exam  Constitutional:       General: He is not in acute distress.     Appearance: He is not ill-appearing.    HENT:      Right Ear: Tympanic membrane normal.      Left Ear: Tympanic membrane normal.      Nose: Congestion present. No rhinorrhea.      Right Sinus: Maxillary sinus tenderness present. No frontal sinus tenderness.      Left Sinus: Maxillary sinus tenderness present. No frontal sinus tenderness.      Mouth/Throat:      Pharynx: No oropharyngeal exudate or posterior oropharyngeal erythema.   Cardiovascular:      Rate and Rhythm: Normal rate and regular rhythm.   Pulmonary:      Effort: Pulmonary effort is normal. No respiratory distress.      Breath sounds: No wheezing or rales.   Neurological:      Mental Status: He is alert.                 Assessment/Plan:         Diagnoses and all orders for this visit:    Sinus congestion  -     azithromycin (ZITHROMAX) 250 mg tablet; Take 2 tablets today then 1 tablet daily x 4 days  -     methylPREDNISolone 4 MG tablet therapy pack; Use as directed on package        Rto prn                     Jamaica Srivastava MD

## 2024-02-21 PROBLEM — Z13.6 SCREENING FOR CARDIOVASCULAR CONDITION: Status: RESOLVED | Noted: 2018-07-18 | Resolved: 2024-02-21

## 2024-02-21 PROBLEM — Z13.1 SCREENING FOR DIABETES MELLITUS: Status: RESOLVED | Noted: 2018-07-18 | Resolved: 2024-02-21

## 2024-03-20 ENCOUNTER — OFFICE VISIT (OUTPATIENT)
Dept: GASTROENTEROLOGY | Facility: CLINIC | Age: 44
End: 2024-03-20
Payer: COMMERCIAL

## 2024-03-20 VITALS
BODY MASS INDEX: 23.13 KG/M2 | DIASTOLIC BLOOD PRESSURE: 80 MMHG | HEIGHT: 71 IN | OXYGEN SATURATION: 99 % | HEART RATE: 68 BPM | WEIGHT: 165.2 LBS | SYSTOLIC BLOOD PRESSURE: 114 MMHG | TEMPERATURE: 97.8 F

## 2024-03-20 DIAGNOSIS — R14.0 BLOATING: ICD-10-CM

## 2024-03-20 DIAGNOSIS — K59.1 FUNCTIONAL DIARRHEA: Primary | ICD-10-CM

## 2024-03-20 DIAGNOSIS — K21.9 GASTROESOPHAGEAL REFLUX DISEASE WITHOUT ESOPHAGITIS: ICD-10-CM

## 2024-03-20 PROCEDURE — 99244 OFF/OP CNSLTJ NEW/EST MOD 40: CPT | Performed by: INTERNAL MEDICINE

## 2024-03-20 NOTE — PROGRESS NOTES
[facility] - Outpatient Consultation  Mayur Alba 43 y.o. male MRN: [id number]  Encounter: [id number]          ASSESSMENT AND PLAN:      1. Functional diarrhea  -Fiber supplementation twice daily  -Probiotic once daily  -Low FODMAP diet was provided and discussed  -Colonoscopy in 1 year    2. Bloating    3. Gastroesophageal reflux disease without esophagitis  -No additional medical therapy at this time    4.  Average risk for colon cancer  -Colonoscopy 2025  ______________________________________________________________________    HPI: Chandan is a 43-year-old male who comes the office today with a complaint of change in bowel habits with broken pieces of stool versus occasional episodes of diarrhea.  Experiences a normal bowel movement on occasion.  He denies constipation.  He denies any rectal bleeding.  He eats a moderate amount of fiber on a daily basis and in fact he is a vegan.  He has been a vegan for the past 9 years.  He tried a gluten-free diet without any significant benefit.  He denies abdominal pain but he does admit to bloating.  He denies nausea, vomiting, gaseousness, rectal bleeding, melena.  He has occasional episodes of heartburn occurring 1-2 times per week.  There is no dysphagia or odynophagia.  There is no family history for colon polyps or for colon cancer.  He has a maternal great grandfather with colon cancer.  The last colonoscopy was performed Shanique 15, 2015.      REVIEW OF SYSTEMS:    CONSTITUTIONAL: Denies any fever, chills, rigors, and weight loss.  HEENT: No earache or tinnitus. Denies hearing loss or visual disturbances.  CARDIOVASCULAR: No chest pain or palpitations.   RESPIRATORY: Denies any cough, hemoptysis, shortness of breath or dyspnea on exertion.  GASTROINTESTINAL: As noted in the History of Present Illness.   GENITOURINARY: No problems with urination. Denies any hematuria or dysuria.  NEUROLOGIC: No dizziness or vertigo, denies headaches.    MUSCULOSKELETAL: Denies any muscle or joint pain.   SKIN: Denies skin rashes or itching.   ENDOCRINE: Denies excessive thirst. Denies intolerance to heat or cold.  PSYCHOSOCIAL: Denies depression or anxiety. Denies any recent memory loss.       Historical Information   Past Medical History:   Diagnosis Date    Cervical disc herniation     2014 per [facility]     Past Surgical History:   Procedure Laterality Date    HERNIA REPAIR       Social History   Social History     Substance and Sexual Activity   Alcohol Use No     Social History     Substance and Sexual Activity   Drug Use No     Social History     Tobacco Use   Smoking Status Former    Current packs/day: 0.00    Types: Cigarettes    Start date: 1993    Quit date: 2010    Years since quittin.3   Smokeless Tobacco Never     Family History   Problem Relation Age of Onset    Hyperlipidemia Mother     Hypertension Mother     Atrial fibrillation Father     COPD Maternal Grandmother     Emphysema Maternal Grandmother     Uveitis Maternal Grandmother     Aortic aneurysm Maternal Grandmother     No Known Problems Sister     No Known Problems Brother     No Known Problems Maternal Aunt     No Known Problems Maternal Uncle     No Known Problems Paternal Aunt     No Known Problems Paternal Uncle     No Known Problems Maternal Grandfather     No Known Problems Paternal Grandmother     No Known Problems Paternal Grandfather     ADD / ADHD Neg Hx     Anesthesia problems Neg Hx     Cancer Neg Hx     Clotting disorder Neg Hx     Collagen disease Neg Hx     Diabetes Neg Hx     Dislocations Neg Hx     Learning disabilities Neg Hx     Neurological problems Neg Hx     Osteoporosis Neg Hx     Rheumatologic disease Neg Hx     Scoliosis Neg Hx     Vascular Disease Neg Hx        Meds/Allergies       Current Outpatient Medications:     Cholecalciferol (VITAMIN D) 2000 units CAPS    multivitamin (THERAGRAN) TABS    methylPREDNISolone 4 MG tablet therapy pack     "vitamin B-12 (VITAMIN B-12) 1,000 mcg tablet    Allergies   Allergen Reactions    Tilactase Other (See Comments) and GI Intolerance     Skin issues            Objective     Blood pressure 114/80, pulse 68, temperature 97.8 °F (36.6 °C), height 5' 11\" (1.803 m), weight 74.9 kg (165 lb 3.2 oz), SpO2 99%. Body mass index is 23.04 kg/m².        PHYSICAL EXAM:      General Appearance:   Alert, cooperative, no distress   HEENT:   Normocephalic, atraumatic, anicteric.     Neck:  Supple, symmetrical, trachea midline   Lungs:   Clear to auscultation bilaterally; no rales, rhonchi or wheezing; respirations unlabored    Heart::   Regular rate and rhythm; no murmur, rub, or gallop.   Abdomen:   Soft, non-tender, non-distended; normal bowel sounds; no masses, no organomegaly    Genitalia:   Deferred    Rectal:   Deferred    Extremities:  No cyanosis, clubbing or edema    Pulses:  2+ and symmetric    Skin:  No jaundice, rashes, or lesions    Lymph nodes:  No palpable cervical lymphadenopathy        Lab Results:   No visits with results within 1 Day(s) from this visit.   Latest known visit with results is:   Office Visit on 11/08/2023   Component Date Value     COLOR,UA 11/08/2023 yellow     CLARITY,UA 11/08/2023 clear     SPECIFIC GRAVITY,UA 11/08/2023 1.010      PH,UA 11/08/2023 6.5     LEUKOCYTE ESTERASE,UA 11/08/2023 neg     NITRITE,UA 11/08/2023 neg     GLUCOSE, UA 11/08/2023 norm     KETONES,UA 11/08/2023 neg     BILIRUBIN,UA 11/08/2023 neg     BLOOD,UA 11/08/2023 50     POCT URINE PROTEIN 11/08/2023 neg     SL AMB POCT UROBILINOGEN 11/08/2023 norm          Radiology Results:   No results found.  "

## 2024-04-09 ENCOUNTER — OFFICE VISIT (OUTPATIENT)
Dept: GASTROENTEROLOGY | Facility: CLINIC | Age: 44
End: 2024-04-09
Payer: COMMERCIAL

## 2024-04-09 VITALS
TEMPERATURE: 97.8 F | HEIGHT: 71 IN | WEIGHT: 159.8 LBS | DIASTOLIC BLOOD PRESSURE: 78 MMHG | BODY MASS INDEX: 22.37 KG/M2 | HEART RATE: 78 BPM | SYSTOLIC BLOOD PRESSURE: 118 MMHG | OXYGEN SATURATION: 98 %

## 2024-04-09 DIAGNOSIS — K62.89 RECTAL PAIN: Primary | ICD-10-CM

## 2024-04-09 PROCEDURE — 99214 OFFICE O/P EST MOD 30 MIN: CPT | Performed by: INTERNAL MEDICINE

## 2024-04-09 RX ORDER — HYDROCORTISONE ACETATE 25 MG/1
25 SUPPOSITORY RECTAL 2 TIMES DAILY
Qty: 12 SUPPOSITORY | Refills: 0 | Status: SHIPPED | OUTPATIENT
Start: 2024-04-09

## 2024-04-09 RX ORDER — HYDROCORTISONE 25 MG/G
CREAM TOPICAL 2 TIMES DAILY
Qty: 100 G | Refills: 3 | Status: SHIPPED | OUTPATIENT
Start: 2024-04-09

## 2024-04-09 NOTE — PROGRESS NOTES
[facility] - Outpatient Follow-up Note  Mayur Alba 43 y.o. male MRN: [id number]  Encounter: [id number]          ASSESSMENT AND PLAN:      1. Rectal pain  - hydrocortisone (ANUSOL-HC) 2.5 % rectal cream; Apply topically 2 (two) times a day  Dispense: 100 g; Refill: 3  - hydrocortisone (ANUSOL-HC) 25 mg suppository; Insert 1 suppository (25 mg total) into the rectum 2 (two) times a day  Dispense: 12 suppository; Refill: 0  -No indication for colonoscopy at this time    ______________________________________________________________________    SUBJECTIVE: Chandan returns to the today with a complaint of rectal pain.  He states this happened after he was straining to have a bowel movement last week.  He then had another episode of probable straining and he began to experience rectal pain.  The pain is made worse by sitting.  He denies any rectal bleeding.  There is been no itching.  He denies abdominal pain, nausea, vomiting, diarrhea, or constipation.  He states that it hurts when he goes to the bathroom.  He also states however that the pain has improved over the past couple of days.  He has a prior history of hemorrhoids.      REVIEW OF SYSTEMS IS OTHERWISE NEGATIVE.      Historical Information   Past Medical History:   Diagnosis Date    Cervical disc herniation     2014 per Allscripts     Past Surgical History:   Procedure Laterality Date    HERNIA REPAIR       Social History   Social History     Substance and Sexual Activity   Alcohol Use No     Social History     Substance and Sexual Activity   Drug Use No     Social History     Tobacco Use   Smoking Status Former    Current packs/day: 0.00    Types: Cigarettes    Start date: 1993    Quit date: 2010    Years since quittin.3   Smokeless Tobacco Never     Family History   Problem Relation Age of Onset    Hyperlipidemia Mother     Hypertension Mother     Atrial fibrillation Father     COPD Maternal Grandmother      "Emphysema Maternal Grandmother     Uveitis Maternal Grandmother     Aortic aneurysm Maternal Grandmother     No Known Problems Sister     No Known Problems Brother     No Known Problems Maternal Aunt     No Known Problems Maternal Uncle     No Known Problems Paternal Aunt     No Known Problems Paternal Uncle     No Known Problems Maternal Grandfather     No Known Problems Paternal Grandmother     No Known Problems Paternal Grandfather     ADD / ADHD Neg Hx     Anesthesia problems Neg Hx     Cancer Neg Hx     Clotting disorder Neg Hx     Collagen disease Neg Hx     Diabetes Neg Hx     Dislocations Neg Hx     Learning disabilities Neg Hx     Neurological problems Neg Hx     Osteoporosis Neg Hx     Rheumatologic disease Neg Hx     Scoliosis Neg Hx     Vascular Disease Neg Hx        Meds/Allergies       Current Outpatient Medications:     hydrocortisone (ANUSOL-HC) 2.5 % rectal cream    hydrocortisone (ANUSOL-HC) 25 mg suppository    Misc Natural Products (FIBER 7 PO)    Probiotic Product (UP4 PROBIOTICS MENS PO)    Cholecalciferol (VITAMIN D) 2000 units CAPS    methylPREDNISolone 4 MG tablet therapy pack    multivitamin (THERAGRAN) TABS    vitamin B-12 (VITAMIN B-12) 1,000 mcg tablet    Allergies   Allergen Reactions    Tilactase Other (See Comments) and GI Intolerance     Skin issues            Objective     Blood pressure 118/78, pulse 78, temperature 97.8 °F (36.6 °C), temperature source Temporal, height 5' 11\" (1.803 m), weight 72.5 kg (159 lb 12.8 oz), SpO2 98%. Body mass index is 22.29 kg/m².      PHYSICAL EXAM:      General Appearance:   Alert, cooperative, no distress   HEENT:   Normocephalic, atraumatic, anicteric.     Neck:  Supple, symmetrical, trachea midline   Lungs:   Clear to auscultation bilaterally; no rales, rhonchi or wheezing; respirations unlabored    Heart::   Regular rate and rhythm; no murmur, rub, or gallop.   Abdomen:   Soft, non-tender, non-distended; normal bowel sounds; no masses, no " organomegaly    Genitalia:   Deferred    Rectal:   Perianal area normal.   No internal hemorrhoids    Extremities:  No cyanosis, clubbing or edema    Pulses:  2+ and symmetric    Skin:  No jaundice, rashes, or lesions    Lymph nodes:  No palpable cervical lymphadenopathy        Lab Results:   No visits with results within 1 Day(s) from this visit.   Latest known visit with results is:   Office Visit on 11/08/2023   Component Date Value     COLOR,UA 11/08/2023 yellow     CLARITY,UA 11/08/2023 clear     SPECIFIC GRAVITY,UA 11/08/2023 1.010      PH,UA 11/08/2023 6.5     LEUKOCYTE ESTERASE,UA 11/08/2023 neg     NITRITE,UA 11/08/2023 neg     GLUCOSE, UA 11/08/2023 norm     KETONES,UA 11/08/2023 neg     BILIRUBIN,UA 11/08/2023 neg     BLOOD,UA 11/08/2023 50     POCT URINE PROTEIN 11/08/2023 neg     SL AMB POCT UROBILINOGEN 11/08/2023 norm          Radiology Results:   No results found.

## 2024-04-10 ENCOUNTER — NURSE TRIAGE (OUTPATIENT)
Age: 44
End: 2024-04-10

## 2024-04-10 NOTE — TELEPHONE ENCOUNTER
"Pt calling in, reports he was prescribed hydrocortisone cream and suppositories yesterday for rectal pain and he is asking how Dr. Breaux would like him to use this.     He reports he thought he was supposed to use the cream BID and the suppositories at night but rx instruction say suppositories BID.     Please advise if pt should use suppositories once a day at night or BID. Thank you   Reason for Disposition   Caller has NON-URGENT medicine question about med that PCP or specialist prescribed and triager unable to answer question    Answer Assessment - Initial Assessment Questions  1. NAME of MEDICATION: \"What medicine are you calling about?\"      Hydrocortisone BID   2. QUESTION: \"What is your question?\" (e.g., medication refill, side effect)      How often to use this   3. PRESCRIBING HCP: \"Who prescribed it?\" Reason: if prescribed by specialist, call should be referred to that group.  GI    Protocols used: Medication Question Call-ADULT-[place]    "

## 2024-04-12 ENCOUNTER — NURSE TRIAGE (OUTPATIENT)
Dept: OTHER | Facility: OTHER | Age: 44
End: 2024-04-12

## 2024-04-12 ENCOUNTER — APPOINTMENT (EMERGENCY)
Dept: CT IMAGING | Facility: HOSPITAL | Age: 44
End: 2024-04-12
Payer: COMMERCIAL

## 2024-04-12 ENCOUNTER — HOSPITAL ENCOUNTER (EMERGENCY)
Facility: HOSPITAL | Age: 44
Discharge: HOME | End: 2024-04-12
Attending: EMERGENCY MEDICINE
Payer: COMMERCIAL

## 2024-04-12 VITALS
RESPIRATION RATE: 18 BRPM | HEART RATE: 65 BPM | TEMPERATURE: 97.3 F | DIASTOLIC BLOOD PRESSURE: 84 MMHG | OXYGEN SATURATION: 100 % | SYSTOLIC BLOOD PRESSURE: 124 MMHG

## 2024-04-12 DIAGNOSIS — N20.0 KIDNEY STONE: Primary | ICD-10-CM

## 2024-04-12 DIAGNOSIS — K40.20 BILATERAL INGUINAL HERNIA WITHOUT OBSTRUCTION OR GANGRENE: ICD-10-CM

## 2024-04-12 DIAGNOSIS — N20.0 RENAL CALCULUS, RIGHT: ICD-10-CM

## 2024-04-12 DIAGNOSIS — N20.1 LEFT URETERAL CALCULUS: Primary | ICD-10-CM

## 2024-04-12 LAB
ALBUMIN SERPL BCP-MCNC: 4.7 G/DL (ref 3.5–5)
ALP SERPL-CCNC: 68 U/L (ref 34–104)
ALT SERPL W P-5'-P-CCNC: 11 U/L (ref 7–52)
ANION GAP SERPL CALCULATED.3IONS-SCNC: 7 MMOL/L (ref 4–13)
AST SERPL W P-5'-P-CCNC: 16 U/L (ref 13–39)
BASOPHILS # BLD AUTO: 0.05 THOUSANDS/ÂΜL (ref 0–0.1)
BASOPHILS NFR BLD AUTO: 1 % (ref 0–1)
BILIRUB SERPL-MCNC: 0.77 MG/DL (ref 0.2–1)
BILIRUB UR QL STRIP: NEGATIVE
BUN SERPL-MCNC: 9 MG/DL (ref 5–25)
CALCIUM SERPL-MCNC: 9.5 MG/DL (ref 8.4–10.2)
CHLORIDE SERPL-SCNC: 102 MMOL/L (ref 96–108)
CLARITY UR: CLEAR
CO2 SERPL-SCNC: 27 MMOL/L (ref 21–32)
COLOR UR: COLORLESS
CREAT SERPL-MCNC: 0.9 MG/DL (ref 0.6–1.3)
EOSINOPHIL # BLD AUTO: 0.08 THOUSAND/ÂΜL (ref 0–0.61)
EOSINOPHIL NFR BLD AUTO: 2 % (ref 0–6)
ERYTHROCYTE [DISTWIDTH] IN BLOOD BY AUTOMATED COUNT: 11.4 % (ref 11.6–15.1)
GFR SERPL CREATININE-BSD FRML MDRD: 104 ML/MIN/1.73SQ M
GLUCOSE SERPL-MCNC: 94 MG/DL (ref 65–140)
GLUCOSE UR STRIP-MCNC: NEGATIVE MG/DL
HCT VFR BLD AUTO: 42.2 % (ref 36.5–49.3)
HGB BLD-MCNC: 14.5 G/DL (ref 12–17)
HGB UR QL STRIP.AUTO: NEGATIVE
IMM GRANULOCYTES # BLD AUTO: 0.01 THOUSAND/UL (ref 0–0.2)
IMM GRANULOCYTES NFR BLD AUTO: 0 % (ref 0–2)
KETONES UR STRIP-MCNC: ABNORMAL MG/DL
LEUKOCYTE ESTERASE UR QL STRIP: NEGATIVE
LYMPHOCYTES # BLD AUTO: 1.45 THOUSANDS/ÂΜL (ref 0.6–4.47)
LYMPHOCYTES NFR BLD AUTO: 32 % (ref 14–44)
MCH RBC QN AUTO: 31.7 PG (ref 26.8–34.3)
MCHC RBC AUTO-ENTMCNC: 34.4 G/DL (ref 31.4–37.4)
MCV RBC AUTO: 92 FL (ref 82–98)
MONOCYTES # BLD AUTO: 0.48 THOUSAND/ÂΜL (ref 0.17–1.22)
MONOCYTES NFR BLD AUTO: 11 % (ref 4–12)
NEUTROPHILS # BLD AUTO: 2.4 THOUSANDS/ÂΜL (ref 1.85–7.62)
NEUTS SEG NFR BLD AUTO: 54 % (ref 43–75)
NITRITE UR QL STRIP: NEGATIVE
NRBC BLD AUTO-RTO: 0 /100 WBCS
PH UR STRIP.AUTO: 6.5 [PH]
PLATELET # BLD AUTO: 239 THOUSANDS/UL (ref 149–390)
PMV BLD AUTO: 9.7 FL (ref 8.9–12.7)
POTASSIUM SERPL-SCNC: 4.7 MMOL/L (ref 3.5–5.3)
PROT SERPL-MCNC: 7.1 G/DL (ref 6.4–8.4)
PROT UR STRIP-MCNC: NEGATIVE MG/DL
RBC # BLD AUTO: 4.58 MILLION/UL (ref 3.88–5.62)
SODIUM SERPL-SCNC: 136 MMOL/L (ref 135–147)
SP GR UR STRIP.AUTO: 1 (ref 1–1.03)
UROBILINOGEN UR STRIP-ACNC: <2 MG/DL
WBC # BLD AUTO: 4.47 THOUSAND/UL (ref 4.31–10.16)

## 2024-04-12 PROCEDURE — 96361 HYDRATE IV INFUSION ADD-ON: CPT

## 2024-04-12 PROCEDURE — 99284 EMERGENCY DEPT VISIT MOD MDM: CPT

## 2024-04-12 PROCEDURE — 96374 THER/PROPH/DIAG INJ IV PUSH: CPT

## 2024-04-12 PROCEDURE — 81003 URINALYSIS AUTO W/O SCOPE: CPT | Performed by: PHYSICIAN ASSISTANT

## 2024-04-12 PROCEDURE — 80053 COMPREHEN METABOLIC PANEL: CPT | Performed by: PHYSICIAN ASSISTANT

## 2024-04-12 PROCEDURE — 36415 COLL VENOUS BLD VENIPUNCTURE: CPT | Performed by: PHYSICIAN ASSISTANT

## 2024-04-12 PROCEDURE — 85025 COMPLETE CBC W/AUTO DIFF WBC: CPT | Performed by: PHYSICIAN ASSISTANT

## 2024-04-12 PROCEDURE — 99284 EMERGENCY DEPT VISIT MOD MDM: CPT | Performed by: PHYSICIAN ASSISTANT

## 2024-04-12 PROCEDURE — 74176 CT ABD & PELVIS W/O CONTRAST: CPT

## 2024-04-12 RX ORDER — OXYCODONE HYDROCHLORIDE 5 MG/1
5 TABLET ORAL EVERY 6 HOURS PRN
Qty: 8 TABLET | Refills: 0 | Status: SHIPPED | OUTPATIENT
Start: 2024-04-12

## 2024-04-12 RX ORDER — TAMSULOSIN HYDROCHLORIDE 0.4 MG/1
0.4 CAPSULE ORAL
Qty: 10 CAPSULE | Refills: 0 | Status: SHIPPED | OUTPATIENT
Start: 2024-04-13

## 2024-04-12 RX ORDER — KETOROLAC TROMETHAMINE 30 MG/ML
15 INJECTION, SOLUTION INTRAMUSCULAR; INTRAVENOUS ONCE
Status: COMPLETED | OUTPATIENT
Start: 2024-04-12 | End: 2024-04-12

## 2024-04-12 RX ORDER — TAMSULOSIN HYDROCHLORIDE 0.4 MG/1
0.4 CAPSULE ORAL ONCE
Status: COMPLETED | OUTPATIENT
Start: 2024-04-12 | End: 2024-04-12

## 2024-04-12 RX ORDER — IBUPROFEN 600 MG/1
600 TABLET ORAL EVERY 6 HOURS PRN
Qty: 20 TABLET | Refills: 0 | Status: SHIPPED | OUTPATIENT
Start: 2024-04-12

## 2024-04-12 RX ORDER — ACETAMINOPHEN 325 MG/1
975 TABLET ORAL ONCE
Status: COMPLETED | OUTPATIENT
Start: 2024-04-12 | End: 2024-04-12

## 2024-04-12 RX ADMIN — SODIUM CHLORIDE 1000 ML: 0.9 INJECTION, SOLUTION INTRAVENOUS at 12:37

## 2024-04-12 RX ADMIN — KETOROLAC TROMETHAMINE 15 MG: 30 INJECTION, SOLUTION INTRAMUSCULAR; INTRAVENOUS at 12:36

## 2024-04-12 RX ADMIN — TAMSULOSIN HYDROCHLORIDE 0.4 MG: 0.4 CAPSULE ORAL at 13:49

## 2024-04-12 RX ADMIN — ACETAMINOPHEN 975 MG: 325 TABLET, FILM COATED ORAL at 13:51

## 2024-04-12 NOTE — TELEPHONE ENCOUNTER
Regarding: Severe Pain / Possible kidney stone  ----- Message from Meka Vinson sent at 4/12/2024 10:00 AM EDT -----  Chandan is scheduled for appointment this afternoon at 2:15pm. He has a hx of passing kidney stones. He is having severe pain on the left side of his back by his kidney. He states it started again on and off starting on Sunday night.   Alarm word: Severe pain of any type    Please advise if OK to wait for appt this afternoon.   Thank you!

## 2024-04-12 NOTE — TELEPHONE ENCOUNTER
Patient stated that his pain in moderate now, he is still at work. Patient confirmed an appointment for today 4/12 at 1415. Patient agreeable to go to ER if pain gets worse.

## 2024-04-12 NOTE — TELEPHONE ENCOUNTER
"Reason for Disposition  • MODERATE pain (e.g., interferes with normal activities or awakens from sleep)    Answer Assessment - Initial Assessment Questions  1. LOCATION: \"Where does it hurt?\" (e.g., left, right)      Left flank   2. ONSET: \"When did the pain start?\"      Recurrent this morning   3. SEVERITY: \"How bad is the pain?\" (e.g., Scale 1-10; mild, moderate, or severe)    - MILD (1-3): doesn't interfere with normal activities     - MODERATE (4-7): interferes with normal activities or awakens from sleep     - SEVERE (8-10): excruciating pain and patient unable to do normal activities (stays in bed)        5 out of 10 now.   4. PATTERN: \"Does the pain come and go, or is it constant?\"       Constant   5. CAUSE: \"What do you think is causing the pain?\"      Possible kidney stones.   6. OTHER SYMPTOMS:  \"Do you have any other symptoms?\" (e.g., fever, abdominal pain, vomiting, leg weakness, burning with urination, blood in urine)      No    Protocols used: Flank Pain-ADULT-[place]    "

## 2024-04-12 NOTE — ED PROVIDER NOTES
"History  Chief Complaint   Patient presents with    Flank Pain     Reports L sided flank pain since Sunday night. Denies n/v/d. No urinary symptoms     Patient is a 43-year-old male presenting to the ED for evaluation of left flank pain x5 days. Patient states that he has had intermittent left flank pain since Sunday evening that acutely worsened this morning. The pain radiates around to the left side of the abdomen and waxes and wanes in intensity. He denies any fevers, chills, nausea, vomiting, chest pain, SOB, diarrhea or constipation. He denies any dysuria, hematuria, urgency, frequency or urinary retention. He has not taken anything for pain today. Patient states that he had a similar episode in November; however, by the time he got to the ED the pain had resolved and no imaging was performed. He was seen by his PCP for follow-up and says that they ordered a UA which showed occult blood was told that he likely passed a kidney stone. He also had a renal U/S at that time which showed a \"nonobstructing right nephrolithiasis, no left nephrolithiasis or hydronephrosis\".         Prior to Admission Medications   Prescriptions Last Dose Informant Patient Reported? Taking?   Cholecalciferol (VITAMIN D) 2000 units CAPS  Self Yes No   Sig: Take 1 capsule by mouth daily   Patient not taking: Reported on 4/9/2024   Misc Natural Products (FIBER 7 PO)  Self Yes No   Sig: Take by mouth   Probiotic Product (UP4 PROBIOTICS MENS PO)  Self Yes No   Sig: Take by mouth   hydrocortisone (ANUSOL-HC) 2.5 % rectal cream   No No   Sig: Apply topically 2 (two) times a day   hydrocortisone (ANUSOL-HC) 25 mg suppository   No No   Sig: Insert 1 suppository (25 mg total) into the rectum 2 (two) times a day   methylPREDNISolone 4 MG tablet therapy pack  Self No No   Sig: Use as directed on package   Patient not taking: Reported on 4/9/2024   multivitamin (THERAGRAN) TABS  Self Yes No   Sig: Take 1 tablet by mouth daily On and off   Patient not " taking: Reported on 2024   vitamin B-12 (VITAMIN B-12) 1,000 mcg tablet  Self Yes No   Sig: Take by mouth daily   Patient not taking: Reported on 2024      Facility-Administered Medications: None       Past Medical History:   Diagnosis Date    Cervical disc herniation     2014 per Allscripts       Past Surgical History:   Procedure Laterality Date    HERNIA REPAIR         Family History   Problem Relation Age of Onset    Hyperlipidemia Mother     Hypertension Mother     Atrial fibrillation Father     COPD Maternal Grandmother     Emphysema Maternal Grandmother     Uveitis Maternal Grandmother     Aortic aneurysm Maternal Grandmother     No Known Problems Sister     No Known Problems Brother     No Known Problems Maternal Aunt     No Known Problems Maternal Uncle     No Known Problems Paternal Aunt     No Known Problems Paternal Uncle     No Known Problems Maternal Grandfather     No Known Problems Paternal Grandmother     No Known Problems Paternal Grandfather     ADD / ADHD Neg Hx     Anesthesia problems Neg Hx     Cancer Neg Hx     Clotting disorder Neg Hx     Collagen disease Neg Hx     Diabetes Neg Hx     Dislocations Neg Hx     Learning disabilities Neg Hx     Neurological problems Neg Hx     Osteoporosis Neg Hx     Rheumatologic disease Neg Hx     Scoliosis Neg Hx     Vascular Disease Neg Hx      I have reviewed and agree with the history as documented.    E-Cigarette/Vaping    E-Cigarette Use Never User      E-Cigarette/Vaping Substances     Social History     Tobacco Use    Smoking status: Former     Current packs/day: 0.00     Types: Cigarettes     Start date: 1993     Quit date: 2010     Years since quittin.3    Smokeless tobacco: Never   Vaping Use    Vaping status: Never Used   Substance Use Topics    Alcohol use: No    Drug use: No       Review of Systems   Constitutional:  Negative for chills and fever.   HENT:  Negative for ear pain and sore throat.    Eyes:  Negative for  pain and visual disturbance.   Respiratory:  Negative for cough and shortness of breath.    Cardiovascular:  Negative for chest pain and palpitations.   Gastrointestinal:  Positive for abdominal pain. Negative for constipation, diarrhea, nausea and vomiting.   Genitourinary:  Positive for flank pain. Negative for dysuria, frequency, hematuria and testicular pain.   Musculoskeletal:  Positive for back pain. Negative for neck pain.   Skin:  Negative for rash.   Neurological:  Negative for seizures, syncope and headaches.   All other systems reviewed and are negative.      Physical Exam  Physical Exam  Vitals and nursing note reviewed.   Constitutional:       General: He is awake. He is not in acute distress.     Appearance: Normal appearance. He is well-developed. He is not ill-appearing or diaphoretic.   HENT:      Head: Normocephalic and atraumatic.      Right Ear: External ear normal.      Left Ear: External ear normal.      Nose: Nose normal.      Mouth/Throat:      Lips: Pink.      Mouth: Mucous membranes are moist.   Eyes:      General: Lids are normal. No scleral icterus.     Conjunctiva/sclera: Conjunctivae normal.      Pupils: Pupils are equal, round, and reactive to light.   Cardiovascular:      Rate and Rhythm: Normal rate and regular rhythm.      Pulses: Normal pulses.           Radial pulses are 2+ on the right side and 2+ on the left side.      Heart sounds: Normal heart sounds, S1 normal and S2 normal.   Pulmonary:      Effort: Pulmonary effort is normal. No accessory muscle usage.      Breath sounds: Normal breath sounds. No stridor. No decreased breath sounds, wheezing, rhonchi or rales.   Abdominal:      General: Abdomen is flat. Bowel sounds are normal. There is no distension.      Palpations: Abdomen is soft.      Tenderness: There is abdominal tenderness in the left lower quadrant. There is left CVA tenderness. There is no right CVA tenderness, guarding or rebound.   Musculoskeletal:      Cervical  back: Full passive range of motion without pain, normal range of motion and neck supple. No signs of trauma. No pain with movement. Normal range of motion.      Right lower leg: No edema.      Left lower leg: No edema.   Lymphadenopathy:      Cervical: No cervical adenopathy.   Skin:     General: Skin is warm and dry.      Capillary Refill: Capillary refill takes less than 2 seconds.      Coloration: Skin is not cyanotic, jaundiced or pale.   Neurological:      Mental Status: He is alert and oriented to person, place, and time.      GCS: GCS eye subscore is 4. GCS verbal subscore is 5. GCS motor subscore is 6.      Cranial Nerves: No dysarthria or facial asymmetry.      Gait: Gait normal.   Psychiatric:         Attention and Perception: Attention normal.         Mood and Affect: Mood normal.         Speech: Speech normal.         Behavior: Behavior normal. Behavior is cooperative.         Vital Signs  ED Triage Vitals   Temperature Pulse Respirations Blood Pressure SpO2   04/12/24 1117 04/12/24 1117 04/12/24 1117 04/12/24 1117 04/12/24 1117   (!) 97.3 °F (36.3 °C) 65 18 (!) 171/104 100 %      Temp Source Heart Rate Source Patient Position - Orthostatic VS BP Location FiO2 (%)   04/12/24 1117 04/12/24 1117 04/12/24 1117 04/12/24 1117 --   Oral Monitor Sitting Right arm       Pain Score       04/12/24 1337       1           Vitals:    04/12/24 1117 04/12/24 1342   BP: (!) 171/104 124/84   Pulse: 65    Patient Position - Orthostatic VS: Sitting          Visual Acuity      ED Medications  Medications   sodium chloride 0.9 % bolus 1,000 mL (0 mL Intravenous Stopped 4/12/24 1354)   ketorolac (TORADOL) injection 15 mg (15 mg Intravenous Given 4/12/24 1236)   tamsulosin (FLOMAX) capsule 0.4 mg (0.4 mg Oral Given 4/12/24 1349)   acetaminophen (TYLENOL) tablet 975 mg (975 mg Oral Given 4/12/24 1351)       Diagnostic Studies  Results Reviewed       Procedure Component Value Units Date/Time    Comprehensive metabolic panel  [[id number]] Collected: 04/12/24 1212    Lab Status: Final result Specimen: Blood from Arm, Right Updated: 04/12/24 1301     Sodium 136 mmol/L      Potassium 4.7 mmol/L      Chloride 102 mmol/L      CO2 27 mmol/L      ANION GAP 7 mmol/L      BUN 9 mg/dL      Creatinine 0.90 mg/dL      Glucose 94 mg/dL      Calcium 9.5 mg/dL      AST 16 U/L      ALT 11 U/L      Alkaline Phosphatase 68 U/L      Total Protein 7.1 g/dL      Albumin 4.7 g/dL      Total Bilirubin 0.77 mg/dL      eGFR 104 ml/min/1.73sq m     Narrative:      National Kidney Disease Foundation guidelines for Chronic Kidney Disease (CKD):     Stage 1 with normal or high GFR (GFR > 90 mL/min/1.73 square meters)    Stage 2 Mild CKD (GFR = 60-89 mL/min/1.73 square meters)    Stage 3A Moderate CKD (GFR = 45-59 mL/min/1.73 square meters)    Stage 3B Moderate CKD (GFR = 30-44 mL/min/1.73 square meters)    Stage 4 Severe CKD (GFR = 15-29 mL/min/1.73 square meters)    Stage 5 End Stage CKD (GFR <15 mL/min/1.73 square meters)  Note: GFR calculation is accurate only with a steady state creatinine    UA w Reflex to Microscopic w Reflex to Culture [[id number]]  (Abnormal) Collected: 04/12/24 1213    Lab Status: Final result Specimen: Urine, Clean Catch Updated: 04/12/24 1234     Color, UA Colorless     Clarity, UA Clear     Specific Gravity, UA 1.003     pH, UA 6.5     Leukocytes, UA Negative     Nitrite, UA Negative     Protein, UA Negative mg/dl      Glucose, UA Negative mg/dl      Ketones, UA 10 (1+) mg/dl      Urobilinogen, UA <2.0 mg/dl      Bilirubin, UA Negative     Occult Blood, UA Negative    CBC and differential [[id number]]  (Abnormal) Collected: 04/12/24 1212    Lab Status: Final result Specimen: Blood from Arm, Right Updated: 04/12/24 1218     WBC 4.47 Thousand/uL      RBC 4.58 Million/uL      Hemoglobin 14.5 g/dL      Hematocrit 42.2 %      MCV 92 fL      MCH 31.7 pg      MCHC 34.4 g/dL      RDW 11.4 %      MPV 9.7 fL      Platelets 239 Thousands/uL      nRBC  "0 /100 WBCs      Segmented % 54 %      Immature Grans % 0 %      Lymphocytes % 32 %      Monocytes % 11 %      Eosinophils Relative 2 %      Basophils Relative 1 %      Absolute Neutrophils 2.40 Thousands/µL      Absolute Immature Grans 0.01 Thousand/uL      Absolute Lymphocytes 1.45 Thousands/µL      Absolute Monocytes 0.48 Thousand/µL      Eosinophils Absolute 0.08 Thousand/µL      Basophils Absolute 0.05 Thousands/µL                    CT renal stone study abdomen pelvis without contrast   Final Result by Juan Antonio Moss MD (04/12 1307)      5 mm left distal ureteral calculus with mild upstream hydronephroureterosis. No perinephric collection.      3 mm right renal nonobstructing calculus.      The study was marked in EPIC for immediate notification.         Workstation performed: [id number]                    Procedures  Procedures         ED Course                                             Medical Decision Making  Patient is a 43-year-old male presenting to the ED for evaluation of left flank pain x5 days.    DDx including but not limited to: renal colic, pyelonephritis, UTI, diverticulitis, musculoskeletal pain, muscle spasm; doubt AAA.     I reviewed all of patient's labs which are unremarkable. Kidney function is normal. UA with no evidence of occult blood or infection. CT shows a \"5 mm left distal ureteral calculus with mild upstream hydroureteronephrosis\". Patient's pain significantly improved after Toradol, currently rates at a 1/10. He feels comfortable trying to pass stone at home. He was given IV fluids and an initial dose of Flomax while in the ED. He was encouraged to strain all of his urine, increase fluid intake and take flomax daily until stone is passed. He was advised to schedule an appointment with urology for follow-up or return to the ED for any intractable pain, fever/chills, difficulty urinating or any other new/worsening symptoms.    The management plan was discussed in " detail with the patient at bedside and all questions were answered. Strict ED return instructions were discussed at bedside. Prior to discharge, both verbal and written instructions were provided. We discussed the signs and symptoms that should prompt the patient to return to the ED. All questions were answered and the patient was comfortable with the plan of care and discharged home. The patient agrees to return to the Emergency Department for concerns and/or progression of illness.     Problems Addressed:  Bilateral inguinal hernia without obstruction or gangrene:     Details: Patient notes intermittent pain associated with right inguinal hernia. He was given contact information for general surgery for follow-up to discuss possible need for elective repair. Discussed signs/symptoms of strangulated/incarcerated hernias that would necessitate immediate return.     Amount and/or Complexity of Data Reviewed  Labs: ordered.  Radiology: ordered.    Risk  OTC drugs.  Prescription drug management.             Disposition  Final diagnoses:   Left ureteral calculus   Renal calculus, right   Bilateral inguinal hernia without obstruction or gangrene     Time reflects when diagnosis was documented in both MDM as applicable and the Disposition within this note       Time User Action Codes Description Comment    4/12/2024  1:38 PM Sadia Yang Add [N20.1] Left ureteral calculus     4/12/2024  1:38 PM Sadia Yang Add [N20.0] Renal calculus, right     4/12/2024  1:38 PM Sadia Yang Add [K40.20] Bilateral inguinal hernia without obstruction or gangrene           ED Disposition       ED Disposition   Discharge    Condition   Stable    Date/Time   Fri Apr 12, 2024  1:38 PM    Comment   Mayur Alba discharge to home/self care.                   Follow-up Information       Follow up With Specialties Details Why Contact Info Additional Information    [facility] Schedule an appointment  as soon as possible for a visit   [address] [facility]  [address]  [facility] [id number] [phone] [facility] [place], [address], [place], [place], [id number] [phone]    [facility] Schedule an appointment as soon as possible for a visit   [address] [facility] [address] [phone] [facility],[address] [place], [address], [phone]    [facility] Emergency Medicine  If symptoms worsen [address] [facility] [address]  [phone] [facility], [address] [place], [place], [address]            Discharge Medication List as of 4/12/2024  1:42 PM        START taking these medications    Details   ibuprofen (MOTRIN) 600 mg tablet Take 1 tablet (600 mg total) by mouth every 6 (six) hours as needed for mild pain, Starting Fri 4/12/2024, Normal      oxyCODONE (ROXICODONE) 5 immediate release tablet Take 1 tablet (5 mg total) by mouth every 6 (six) hours as needed for moderate pain Max Daily Amount: 20 mg, Starting Fri 4/12/2024, Normal      tamsulosin (FLOMAX) 0.4 mg Take 1 capsule (0.4 mg total) by mouth daily with dinner Do not start before April 13, 2024., Starting Sat 4/13/2024, Normal           CONTINUE these medications which have NOT CHANGED    Details   Cholecalciferol (VITAMIN D) 2000 units CAPS Take 1 capsule by mouth daily, Historical Med      hydrocortisone (ANUSOL-HC) 2.5 % rectal cream Apply topically 2 (two) times a day, Starting Tue 4/9/2024, Normal      hydrocortisone (ANUSOL-HC) 25 mg suppository Insert 1 suppository (25 mg total) into the rectum 2 (two) times a day, Starting Tue 4/9/2024, Normal      methylPREDNISolone 4 MG tablet therapy pack Use as directed on package, Normal      Misc Natural Products (FIBER 7 PO) Take by mouth, Historical Med       multivitamin (THERAGRAN) TABS Take 1 tablet by mouth daily On and off, Historical Med      Probiotic Product (UP4 PROBIOTICS MENS PO) Take by mouth, Historical Med      vitamin B-12 (VITAMIN B-12) 1,000 mcg tablet Take by mouth daily, Historical Med                 PDMP Review       None            ED Provider  Electronically Signed by             Sadia Yang PA-C  04/12/24 4725

## 2024-04-15 ENCOUNTER — TELEPHONE (OUTPATIENT)
Age: 44
End: 2024-04-15

## 2024-04-15 NOTE — TELEPHONE ENCOUNTER
Pt was in ER 4/12/24 due to kidney stones.  Decision tree says pt should be seen in 2 weeks.  Could not schedule pt until 6/18/24.    Pt has pain and stated ED mentioned possible surgery to remove kidney stone.    Please review for appropriate time frame for visit.    Pt cb [phone]

## 2024-04-15 NOTE — TELEPHONE ENCOUNTER
Patient seen in ER 4/12 for acute left flank pain. Imaging obtained-    IMPRESSION:     5 mm left distal ureteral calculus with mild upstream hydronephroureterosis. No perinephric collection.     3 mm right renal nonobstructing calculus.        Please help with finding appt in the next 1-2 weeks with an AP or MD. Any office if patient willing to drive

## 2024-04-16 ENCOUNTER — TELEPHONE (OUTPATIENT)
Dept: UROLOGY | Facility: CLINIC | Age: 44
End: 2024-04-16

## 2024-04-16 ENCOUNTER — HOSPITAL ENCOUNTER (OUTPATIENT)
Dept: RADIOLOGY | Facility: HOSPITAL | Age: 44
Discharge: HOME | End: 2024-04-16
Payer: COMMERCIAL

## 2024-04-16 ENCOUNTER — OFFICE VISIT (OUTPATIENT)
Dept: UROLOGY | Facility: CLINIC | Age: 44
End: 2024-04-16
Payer: COMMERCIAL

## 2024-04-16 VITALS
OXYGEN SATURATION: 97 % | DIASTOLIC BLOOD PRESSURE: 80 MMHG | WEIGHT: 161 LBS | HEART RATE: 90 BPM | SYSTOLIC BLOOD PRESSURE: 120 MMHG | HEIGHT: 71 IN | BODY MASS INDEX: 22.54 KG/M2

## 2024-04-16 DIAGNOSIS — N20.1 LEFT URETERAL STONE: ICD-10-CM

## 2024-04-16 DIAGNOSIS — N20.0 KIDNEY STONE: ICD-10-CM

## 2024-04-16 DIAGNOSIS — Z01.818 PRE-OP TESTING: Primary | ICD-10-CM

## 2024-04-16 PROCEDURE — 87086 URINE CULTURE/COLONY COUNT: CPT | Performed by: UROLOGY

## 2024-04-16 PROCEDURE — 99204 OFFICE O/P NEW MOD 45 MIN: CPT | Performed by: UROLOGY

## 2024-04-16 PROCEDURE — 74018 RADEX ABDOMEN 1 VIEW: CPT

## 2024-04-16 NOTE — PATIENT INSTRUCTIONS
We will put in for case for 4/17/2024 for treatment of the left ureteral stone.  If you want to delay for a week or so we can do that.  The information will be in the system.  If you find yourself in a lot of discomfort and have to go to the emergency room, the records will be there also.

## 2024-04-16 NOTE — TELEPHONE ENCOUNTER
Spoke with patient and confirmed surgery date of: 4/17/24  Type of surgery: #5   Operating physician: Dr. Odonnell  Location of surgery: [facility]     Verbally went over prep with patient on:   NPO  Bowel prep? No  Hospital calls afternoon prior with arrival time -Calls Friday afternoon for Monday surgeries  Patient needs ride to and from surgery (outpatient/inpatient)   Pre-op testing to be done 2 weeks prior to surgery  none  Blood thinners:   None   Clearances needed: none    Mailed/emailed to patient on:  Copy of packet scanned into Media on:  Labs in packet  Soap / Bowel prep in packet  Pre-op & Post-op in packet  Dates of H&P and post-op if needed    Consent: in Media

## 2024-04-16 NOTE — PROGRESS NOTES
Mayur Alba is a(n) 43 y.o. male. , :  1980    Subjective   Chief Complaint:   Chief Complaint   Patient presents with    Nephrolithiasis     NEW PT      Diagnoses: There were no encounter diagnoses.    Assessment/Plan  5 mm left distal ureteral stone with mild hydronephrosis.  Patient occasionally in significant discomfort.  Risks and benefits discussed with patient of ureteroscopy on the procedure that is performed.  Understands there is a risk for nephrostomy tube placement and failure of treatment of the stone.  There is a risk for damage to the urethra bladder and ureter.  Risk for infection.  Risk for persistent bleeding.    Patient Instructions   We will put in for case for 2024 for treatment of the left ureteral stone.  If you want to delay for a week or so we can do that.  The information will be in the system.  If you find yourself in a lot of discomfort and have to go to the emergency room, the records will be there also.     Radiology  Left distal ureteral stone on CT imaging 2024.  KUB 2023 - no obvious calculus  Renal ultrasound 2023 -left distal ureteral stone no evidence of stone in left kidney.  Small stone right kidney.  No hydronephrosis.     History  Left distal ureteral stone with flank pain,  5mm 2024 [place] emergency room      Prior Visits  None    2024 SHAMIR Odonnell  Patient here with mild discomfort today.  Pretty severe on  and last Friday for stone in the left distal ureter.  Options discussed with patient.  Wants to consider.  Is agreeable to be put on the schedule but might be canceled if he wants to continue with medical expulsive therapy.  He is on Flomax.  Agrees to strain his urine.  KUB may not be helpful since stone was not visible in November last year.  But it is possible that either he was uric acid stone or this is a de esha stone.  Patient had an interesting history of some penile trauma maybe 10 years ago with  "now decreased urine stream.  He probably had some form of a urethral disruption so that increases the possibility that he might have urethral stricture disease.  I did advise him that it could increase the likelihood of trouble in the OR.  Might not get to his stone and might require nephrostomy tube placement.  Will not know until we scope him.    No results found for: \"PSA\"  No results found for: \"TESTOSTERONE\"  No components found for: \"CR\"    Allergies   Allergen Reactions    Tilactase Other (See Comments) and GI Intolerance     Skin issues        Review of Systems    Past Surgical History:   Procedure Laterality Date    HERNIA REPAIR         Family History   Problem Relation Age of Onset    Hyperlipidemia Mother     Hypertension Mother     Atrial fibrillation Father     COPD Maternal Grandmother     Emphysema Maternal Grandmother     Uveitis Maternal Grandmother     Aortic aneurysm Maternal Grandmother     No Known Problems Sister     No Known Problems Brother     No Known Problems Maternal Aunt     No Known Problems Maternal Uncle     No Known Problems Paternal Aunt     No Known Problems Paternal Uncle     No Known Problems Maternal Grandfather     No Known Problems Paternal Grandmother     No Known Problems Paternal Grandfather     ADD / ADHD Neg Hx     Anesthesia problems Neg Hx     Cancer Neg Hx     Clotting disorder Neg Hx     Collagen disease Neg Hx     Diabetes Neg Hx     Dislocations Neg Hx     Learning disabilities Neg Hx     Neurological problems Neg Hx     Osteoporosis Neg Hx     Rheumatologic disease Neg Hx     Scoliosis Neg Hx     Vascular Disease Neg Hx        Social History     Socioeconomic History    Marital status: /Civil Union     Spouse name: Not on file    Number of children: Not on file    Years of education: Not on file    Highest education level: Not on file   Occupational History    Not on file   Tobacco Use    Smoking status: Former     Current packs/day: 0.00     Types: " Cigarettes     Start date: 1993     Quit date: 2010     Years since quittin.3    Smokeless tobacco: Never   Vaping Use    Vaping status: Never Used   Substance and Sexual Activity    Alcohol use: No    Drug use: No    Sexual activity: Yes     Partners: Female   Other Topics Concern    Not on file   Social History Narrative    Not on file     Social Determinants of Health     Financial Resource Strain: Not on file   Food Insecurity: Not on file   Transportation Needs: Not on file   Physical Activity: Not on file   Stress: Not on file   Social Connections: Not on file   Intimate Partner Violence: Not on file   Housing Stability: Not on file         Current Outpatient Medications:     hydrocortisone (ANUSOL-HC) 2.5 % rectal cream, Apply topically 2 (two) times a day, Disp: 100 g, Rfl: 3    hydrocortisone (ANUSOL-HC) 25 mg suppository, Insert 1 suppository (25 mg total) into the rectum 2 (two) times a day, Disp: 12 suppository, Rfl: 0    ibuprofen (MOTRIN) 600 mg tablet, Take 1 tablet (600 mg total) by mouth every 6 (six) hours as needed for mild pain, Disp: 20 tablet, Rfl: 0    Misc Natural Products (FIBER 7 PO), Take by mouth, Disp: , Rfl:     oxyCODONE (ROXICODONE) 5 immediate release tablet, Take 1 tablet (5 mg total) by mouth every 6 (six) hours as needed for moderate pain Max Daily Amount: 20 mg, Disp: 8 tablet, Rfl: 0    Probiotic Product (UP4 PROBIOTICS MENS PO), Take by mouth, Disp: , Rfl:     tamsulosin (FLOMAX) 0.4 mg, Take 1 capsule (0.4 mg total) by mouth daily with dinner Do not start before 2024., Disp: 10 capsule, Rfl: 0    Cholecalciferol (VITAMIN D) 2000 units CAPS, Take 1 capsule by mouth daily (Patient not taking: Reported on 2024), Disp: , Rfl:     methylPREDNISolone 4 MG tablet therapy pack, Use as directed on package (Patient not taking: Reported on 2024), Disp: 21 each, Rfl: 0    multivitamin (THERAGRAN) TABS, Take 1 tablet by mouth daily On and off (Patient not  "taking: Reported on 4/9/2024), Disp: , Rfl:     vitamin B-12 (VITAMIN B-12) 1,000 mcg tablet, Take by mouth daily (Patient not taking: Reported on 4/9/2024), Disp: , Rfl:     Objective     /80 (BP Location: Left arm, Patient Position: Sitting, Cuff Size: Standard)   Pulse 90   Ht 5' 11\" (1.803 m)   Wt 73 kg (161 lb)   SpO2 97%   BMI 22.45 kg/m²     Physical Exam      Silas Belle, [facility]  "

## 2024-04-17 ENCOUNTER — APPOINTMENT (OUTPATIENT)
Dept: RADIOLOGY | Facility: HOSPITAL | Age: 44
End: 2024-04-17
Payer: COMMERCIAL

## 2024-04-17 ENCOUNTER — ANESTHESIA (OUTPATIENT)
Dept: PERIOP | Facility: HOSPITAL | Age: 44
End: 2024-04-17
Payer: COMMERCIAL

## 2024-04-17 ENCOUNTER — HOSPITAL ENCOUNTER (OUTPATIENT)
Facility: HOSPITAL | Age: 44
Setting detail: OUTPATIENT SURGERY
Discharge: HOME | End: 2024-04-17
Attending: UROLOGY | Admitting: UROLOGY
Payer: COMMERCIAL

## 2024-04-17 ENCOUNTER — ANESTHESIA EVENT (OUTPATIENT)
Dept: PERIOP | Facility: HOSPITAL | Age: 44
End: 2024-04-17
Payer: COMMERCIAL

## 2024-04-17 VITALS
OXYGEN SATURATION: 100 % | TEMPERATURE: 97.6 F | SYSTOLIC BLOOD PRESSURE: 145 MMHG | RESPIRATION RATE: 18 BRPM | HEART RATE: 73 BPM | DIASTOLIC BLOOD PRESSURE: 91 MMHG

## 2024-04-17 DIAGNOSIS — N20.1 LEFT URETERAL STONE: Primary | ICD-10-CM

## 2024-04-17 PROBLEM — N35.014 POST-TRAUMATIC MALE URETHRAL STRICTURE: Status: ACTIVE | Noted: 2024-04-17

## 2024-04-17 LAB — BACTERIA UR CULT: NORMAL

## 2024-04-17 PROCEDURE — C1769 GUIDE WIRE: HCPCS | Performed by: UROLOGY

## 2024-04-17 PROCEDURE — NC001 PR NO CHARGE: Performed by: UROLOGY

## 2024-04-17 PROCEDURE — C2617 STENT, NON-COR, TEM W/O DEL: HCPCS | Performed by: UROLOGY

## 2024-04-17 PROCEDURE — 74420 UROGRAPHY RTRGR +-KUB: CPT

## 2024-04-17 PROCEDURE — 52356 CYSTO/URETERO W/LITHOTRIPSY: CPT | Performed by: UROLOGY

## 2024-04-17 DEVICE — INLAY URETERAL STENT W/O GUIDEWIRE
Type: IMPLANTABLE DEVICE | Site: URETER | Status: FUNCTIONAL
Brand: BARD® INLAY® URETERAL STENT

## 2024-04-17 RX ORDER — CEFADROXIL 500 MG/1
500 CAPSULE ORAL EVERY 12 HOURS
Qty: 6 CAPSULE | Refills: 0 | Status: SHIPPED | OUTPATIENT
Start: 2024-04-17 | End: 2024-04-20

## 2024-04-17 RX ORDER — MAGNESIUM HYDROXIDE 1200 MG/15ML
LIQUID ORAL AS NEEDED
Status: DISCONTINUED | OUTPATIENT
Start: 2024-04-17 | End: 2024-04-17 | Stop reason: HOSPADM

## 2024-04-17 RX ORDER — DEXAMETHASONE SODIUM PHOSPHATE 10 MG/ML
INJECTION, SOLUTION INTRAMUSCULAR; INTRAVENOUS AS NEEDED
Status: DISCONTINUED | OUTPATIENT
Start: 2024-04-17 | End: 2024-04-17

## 2024-04-17 RX ORDER — OXYCODONE HYDROCHLORIDE 5 MG/1
5 TABLET ORAL EVERY 4 HOURS PRN
Qty: 6 TABLET | Refills: 0 | Status: SHIPPED | OUTPATIENT
Start: 2024-04-17

## 2024-04-17 RX ORDER — SODIUM CHLORIDE, SODIUM LACTATE, POTASSIUM CHLORIDE, CALCIUM CHLORIDE 600; 310; 30; 20 MG/100ML; MG/100ML; MG/100ML; MG/100ML
100 INJECTION, SOLUTION INTRAVENOUS CONTINUOUS
Status: CANCELLED | OUTPATIENT
Start: 2024-04-17

## 2024-04-17 RX ORDER — CEFAZOLIN SODIUM 2 G/50ML
2000 SOLUTION INTRAVENOUS ONCE
Status: COMPLETED | OUTPATIENT
Start: 2024-04-17 | End: 2024-04-17

## 2024-04-17 RX ORDER — PROPOFOL 10 MG/ML
INJECTION, EMULSION INTRAVENOUS AS NEEDED
Status: DISCONTINUED | OUTPATIENT
Start: 2024-04-17 | End: 2024-04-17

## 2024-04-17 RX ORDER — FENTANYL CITRATE 50 UG/ML
INJECTION, SOLUTION INTRAMUSCULAR; INTRAVENOUS AS NEEDED
Status: DISCONTINUED | OUTPATIENT
Start: 2024-04-17 | End: 2024-04-17

## 2024-04-17 RX ORDER — HYDROMORPHONE HCL IN WATER/PF 6 MG/30 ML
0.2 PATIENT CONTROLLED ANALGESIA SYRINGE INTRAVENOUS
Status: DISCONTINUED | OUTPATIENT
Start: 2024-04-17 | End: 2024-04-17 | Stop reason: HOSPADM

## 2024-04-17 RX ORDER — ONDANSETRON 2 MG/ML
4 INJECTION INTRAMUSCULAR; INTRAVENOUS ONCE AS NEEDED
Status: DISCONTINUED | OUTPATIENT
Start: 2024-04-17 | End: 2024-04-17 | Stop reason: HOSPADM

## 2024-04-17 RX ORDER — ONDANSETRON 2 MG/ML
INJECTION INTRAMUSCULAR; INTRAVENOUS AS NEEDED
Status: DISCONTINUED | OUTPATIENT
Start: 2024-04-17 | End: 2024-04-17

## 2024-04-17 RX ORDER — MIDAZOLAM HYDROCHLORIDE 2 MG/2ML
INJECTION, SOLUTION INTRAMUSCULAR; INTRAVENOUS AS NEEDED
Status: DISCONTINUED | OUTPATIENT
Start: 2024-04-17 | End: 2024-04-17

## 2024-04-17 RX ORDER — SODIUM CHLORIDE, SODIUM LACTATE, POTASSIUM CHLORIDE, CALCIUM CHLORIDE 600; 310; 30; 20 MG/100ML; MG/100ML; MG/100ML; MG/100ML
125 INJECTION, SOLUTION INTRAVENOUS CONTINUOUS
Status: DISCONTINUED | OUTPATIENT
Start: 2024-04-17 | End: 2024-04-17 | Stop reason: HOSPADM

## 2024-04-17 RX ORDER — LIDOCAINE HYDROCHLORIDE 10 MG/ML
INJECTION, SOLUTION EPIDURAL; INFILTRATION; INTRACAUDAL; PERINEURAL AS NEEDED
Status: DISCONTINUED | OUTPATIENT
Start: 2024-04-17 | End: 2024-04-17

## 2024-04-17 RX ADMIN — CEFAZOLIN SODIUM 1000 MG: 2 SOLUTION INTRAVENOUS at 12:06

## 2024-04-17 RX ADMIN — LIDOCAINE HYDROCHLORIDE 50 MG: 10 INJECTION, SOLUTION EPIDURAL; INFILTRATION; INTRACAUDAL at 12:12

## 2024-04-17 RX ADMIN — HYDROMORPHONE HYDROCHLORIDE 0.2 MG: 0.2 INJECTION, SOLUTION INTRAMUSCULAR; INTRAVENOUS; SUBCUTANEOUS at 13:36

## 2024-04-17 RX ADMIN — ONDANSETRON 4 MG: 2 INJECTION INTRAMUSCULAR; INTRAVENOUS at 12:18

## 2024-04-17 RX ADMIN — SODIUM CHLORIDE, SODIUM LACTATE, POTASSIUM CHLORIDE, AND CALCIUM CHLORIDE 125 ML/HR: .6; .31; .03; .02 INJECTION, SOLUTION INTRAVENOUS at 10:03

## 2024-04-17 RX ADMIN — FENTANYL CITRATE 25 MCG: 50 INJECTION INTRAMUSCULAR; INTRAVENOUS at 12:20

## 2024-04-17 RX ADMIN — MIDAZOLAM 2 MG: 1 INJECTION INTRAMUSCULAR; INTRAVENOUS at 12:06

## 2024-04-17 RX ADMIN — PROPOFOL 150 MG: 10 INJECTION, EMULSION INTRAVENOUS at 12:12

## 2024-04-17 RX ADMIN — HYDROMORPHONE HYDROCHLORIDE 0.2 MG: 0.2 INJECTION, SOLUTION INTRAMUSCULAR; INTRAVENOUS; SUBCUTANEOUS at 13:19

## 2024-04-17 RX ADMIN — HYDROMORPHONE HYDROCHLORIDE 0.2 MG: 0.2 INJECTION, SOLUTION INTRAMUSCULAR; INTRAVENOUS; SUBCUTANEOUS at 14:00

## 2024-04-17 RX ADMIN — DEXAMETHASONE SODIUM PHOSPHATE 10 MG: 10 INJECTION, SOLUTION INTRAMUSCULAR; INTRAVENOUS at 12:15

## 2024-04-17 RX ADMIN — FENTANYL CITRATE 25 MCG: 50 INJECTION INTRAMUSCULAR; INTRAVENOUS at 12:12

## 2024-04-17 RX ADMIN — FENTANYL CITRATE 50 MCG: 50 INJECTION INTRAMUSCULAR; INTRAVENOUS at 12:45

## 2024-04-17 NOTE — ANESTHESIA PREPROCEDURE EVALUATION
Procedure:  CYSTOSCOPY URETEROSCOPY WITH LITHOTRIPSY HOLMIUM LASER, RETROGRADE PYELOGRAM AND INSERTION STENT URETERAL (Left: Bladder)    Relevant Problems   No relevant active problems        Physical Exam    Airway    Mallampati score: II  TM Distance: >3 FB  Neck ROM: full     Dental   No notable dental hx     Cardiovascular  Cardiovascular exam normal    Pulmonary  Pulmonary exam normal     Other Findings        Anesthesia Plan  ASA Score- 2     Anesthesia Type- general with ASA Monitors.         Additional Monitors:     Airway Plan: LMA.           Plan Factors-Exercise tolerance (METS): >4 METS.    Chart reviewed.  Imaging results reviewed. Existing labs reviewed. Patient summary reviewed.    Patient is not a current smoker.              Induction- intravenous.    Postoperative Plan- Plan for postoperative opioid use.     Informed Consent- Anesthetic plan and risks discussed with patient.  I personally reviewed this patient with the CRNA. Discussed and agreed on the Anesthesia Plan with the CRNA..

## 2024-04-17 NOTE — PERIOPERATIVE NURSING NOTE
Pt able to ambulate without difficulties. Pt dressed without assistance. Escorted pt to car via w/c. Pt mahamed d/c.

## 2024-04-17 NOTE — DISCHARGE INSTR - AVS FIRST PAGE
Antibiotic Instructions:  Post-op period:  Take first dose tonight. Take second and third dose tomorrow. (take one basically every 12 hours for a total of 3 doses)    For day of stent removal:  Restart morning of stent removal and take until finished (every 12 hours).  3 doses.    You should be able to remove the stent after 3 or 4 days.  Take the Flomax daily as needed to help relax the prostate while you urinate.  That should alleviate some of the flank pain that comes with having a stent.  Try to limit fluids to about 2 L/day at most.    We will also put an order in for an x-ray for 6 months.  Please follow-up with one of our providers after the x-ray is completed

## 2024-04-17 NOTE — OP NOTE
OPERATIVE REPORT- Dr. Odonnell  PATIENT NAME: Mayur Alba    :  1980  MRN: [id number]  Pt Location: [facility] OR ROOM 04    SURGERY DATE: 2024    Surgeon: Silas Odonnell MD    Pre-op Diagnosis:  Left ureteral stone  Possible urethral stricture    Post-op Diagnosis:  Same  Bulbar urethral stricture, mild    Procedure:  Cystoscopy  Fluoroscopy  Left retrograde pyelography  Left ureteroscopy  Laser lithotripsy  Left ureteral stent placement    Specimen(s):  * No specimens in log *    Estimated Blood Loss: Minimal    Complications: None    Drains: 6 X 26 cm left ureteral stent    Anesthesia type:   General    Indications for surgery:  Please see the dictated history and physical.    Findings:  1. Left ureteral stone.  2. Mild bulbar urethral strictures approximately 26-28 Malagasy    Procedure and Technique:   After obtaining consent and identifying the patient, antibiotics were given as ordered and the patient was brought to the room.  All appropriate leads and monitors were placed and the patient was appropriately positioned on the table.  Anesthesia was administered and the patient was sterilely prepped and draped.  A timeout was performed where the patient name, , procedure, antibiotics, allergies, etc. were discussed.  All in the room were satisfied before the start of the operative procedure.  What follows are the operative findings and events.     Cystoscopy was performed.  imaging was obtained. The stone was located in the distal left ureter and was faintly opaque on x-ray.  A retrograde pyelogram was performed.  This made the stone obvious.  There was some dilation proximal to it.  A guidewire was passed up the ureter to the kidney and the scope was removed.  The wire was secured to the drape as a safety wire.  The rigid ureteroscope was passed up the ureter to the stone. The stone was identified and a picture was taken. The stone was broken with a 272 micron laser fiber. The scope was  "withdrawn down the ureter evaluating for any trauma. There were only insignificant stone fragments and minor scope trauma noted.  The safety wire was backloaded through the cystoscope and the stent was placed over the wire.  The wire was removed leaving a good coil proximally in the kidney and distally in the bladder.  The bladder was drained.  The string was left attached to the end of the stent.  It was secured to the head of the penis with Mastisol and Steri-Strips.  The patient was awakened and a few days.transferred to the PACU in satisfactory condition.    Plan:  Stent removal in a few days.  Wife to remove under cover of oral antibiotics.  Follow up KUB in 6 months.    SIGNATURE: Silas Odonnell MD  DATE: April 17, 2024  TIME: 11:57 AM    Portions of the record may have been created with voice recognition software.  Occasional wrong word or \"sound alike\" substitutions may have occurred due to the inherent limitations of voice recognition software.  Read the chart carefully and recognize, using context, where substitutions have occurred.  "

## 2024-04-17 NOTE — H&P
History & Physical - [facility]  Mayur Alba 43 y.o. male MRN: [id number]  Unit/Bed#: OR POOL Encounter: [id number]    ASSESSMENT  Left distal ureteral stone with flank pain,  5mm 4/12/2024.  Possible urethral stricture.    PLAN:  Cysto, possible urethral dilation.  Left ureteroscopy and laser lithotripsy and stent with dangler string.     HISTORY OF PRESENT ILLNESS:   Radiology  Left distal ureteral stone on CT imaging 4/12/2024.  KUB November 8, 2023 - no obvious calculus  Renal ultrasound November 8, 2023 - left distal ureteral stone no evidence of stone in left kidney.  Small stone right kidney.  No hydronephrosis.     History  Left distal ureteral stone with flank pain,  5mm 4/12/2024 [place] emergency room      Prior Visits  None    4/16/2024 SHAMIR Odonnell  Patient here with mild discomfort today.  Pretty severe on Sunday and last Friday for stone in the left distal ureter.  Options discussed with patient.  Wants to consider.  Is agreeable to be put on the schedule but might be canceled if he wants to continue with medical expulsive therapy.  He is on Flomax.  Agrees to strain his urine.  KUB may not be helpful since stone was not visible in November last year.  But it is possible that either he was uric acid stone or this is a de esha stone.  Patient had an interesting history of some penile trauma maybe 10 years ago with now decreased urine stream.  He probably had some form of a urethral disruption so that increases the possibility that he might have urethral stricture disease.  I did advise him that it could increase the likelihood of trouble in the OR.  Might not get to his stone and might require nephrostomy tube placement.  Will not know until we scope him.      PAST MEDICAL HISTORY:  Past Medical History:   Diagnosis Date    Cervical disc herniation     6/2/2014 per [employer]       PAST SURGICAL HISTORY:  Past Surgical History:   Procedure Laterality Date    HERNIA REPAIR          ALLERGIES:  Allergies   Allergen Reactions    Tilactase Other (See Comments) and GI Intolerance     Skin issues        SOCIAL HISTORY:  Social History     Substance and Sexual Activity   Alcohol Use No     Social History     Substance and Sexual Activity   Drug Use No     Social History     Tobacco Use   Smoking Status Former    Current packs/day: 0.00    Types: Cigarettes    Start date: 1993    Quit date: 2010    Years since quittin.3   Smokeless Tobacco Never       FAMILY HISTORY:  Family History   Problem Relation Age of Onset    Hyperlipidemia Mother     Hypertension Mother     Atrial fibrillation Father     COPD Maternal Grandmother     Emphysema Maternal Grandmother     Uveitis Maternal Grandmother     Aortic aneurysm Maternal Grandmother     No Known Problems Sister     No Known Problems Brother     No Known Problems Maternal Aunt     No Known Problems Maternal Uncle     No Known Problems Paternal Aunt     No Known Problems Paternal Uncle     No Known Problems Maternal Grandfather     No Known Problems Paternal Grandmother     No Known Problems Paternal Grandfather     ADD / ADHD Neg Hx     Anesthesia problems Neg Hx     Cancer Neg Hx     Clotting disorder Neg Hx     Collagen disease Neg Hx     Diabetes Neg Hx     Dislocations Neg Hx     Learning disabilities Neg Hx     Neurological problems Neg Hx     Osteoporosis Neg Hx     Rheumatologic disease Neg Hx     Scoliosis Neg Hx     Vascular Disease Neg Hx        MEDICATIONS:    Current Facility-Administered Medications:     ceFAZolin (ANCEF) IVPB (premix in dextrose) 2,000 mg 50 mL, 2,000 mg, Intravenous, Once, Silas Odonnell MD    lactated ringers infusion, 125 mL/hr, Intravenous, Continuous, Silas Odonnell MD, Last Rate: 125 mL/hr at 24 1003, 125 mL/hr at 24 1003    Review of Systems   Constitutional:  Negative for chills, fatigue and fever.   HENT:  Negative for congestion.    Respiratory:  Negative for cough, chest  "tightness and shortness of breath.    Cardiovascular:  Negative for chest pain.   Gastrointestinal:  Positive for abdominal pain. Negative for abdominal distention, nausea and vomiting.   Genitourinary:  Positive for decreased urine volume and urgency. Negative for difficulty urinating, penile pain and testicular pain.   Neurological:  Negative for dizziness and headaches.   Psychiatric/Behavioral:  The patient is nervous/anxious.        PHYSICAL EXAM:  Physical Exam  Constitutional:       Appearance: Normal appearance. He is not ill-appearing.   HENT:      Head: Normocephalic.      Mouth/Throat:      Mouth: Mucous membranes are moist.   Cardiovascular:      Rate and Rhythm: Normal rate.      Pulses: Normal pulses.   Pulmonary:      Effort: Pulmonary effort is normal.   Abdominal:      Palpations: Abdomen is soft. There is no mass.      Tenderness: There is no right CVA tenderness, left CVA tenderness or guarding.   Genitourinary:     Penis: Normal.    Skin:     General: Skin is warm and dry.      Capillary Refill: Capillary refill takes less than 2 seconds.      Coloration: Skin is not jaundiced.      Findings: No bruising, erythema or rash.   Neurological:      General: No focal deficit present.      Mental Status: He is alert and oriented to person, place, and time. Mental status is at baseline.   Psychiatric:         Mood and Affect: Mood normal.         Behavior: Behavior normal.         LAB RESULTS:  Lab Results   Component Value Date    WBC 4.47 04/12/2024    HGB 14.5 04/12/2024    HCT 42.2 04/12/2024    MCV 92 04/12/2024     04/12/2024     Lab Results   Component Value Date    SODIUM 136 04/12/2024    K 4.7 04/12/2024     04/12/2024    CO2 27 04/12/2024    BUN 9 04/12/2024    CREATININE 0.90 04/12/2024    GLUC 94 04/12/2024    CALCIUM 9.5 04/12/2024     Lab Results   Component Value Date    CALCIUM 9.5 04/12/2024     No results found for: \"PSA\"    OTHER STUDIES:  4/12/24 CT  IMPRESSION:  5 mm " "left distal ureteral calculus with mild upstream hydronephroureterosis. No perinephric collection.  3 mm right renal nonobstructing calculus.        Silas Odonnell MD  04/17/24    Portions of the record may have been created with voice recognition software.  Occasional wrong word or \"sound alike\" substitutions may have occurred due to the inherent limitations of voice recognition software.  Read the chart carefully and recognize, using context, where substitutions have occurred.  "

## 2024-04-17 NOTE — PERIOPERATIVE NURSING NOTE
Received pt from PACU via stretcher , received report from PACU RN. Pt awake and alert. Pt reports 3/10 pain. Urethral stent in place. Pt able to void urine. Nourishments offered, tolerating well. Wife at bedside.

## 2024-04-17 NOTE — ANESTHESIA POSTPROCEDURE EVALUATION
Post-Op Assessment Note    CV Status:  Stable  Pain Score: 0    Pain management: adequate       Mental Status:  Alert and awake   Hydration Status:  Euvolemic   PONV Controlled:  Controlled   Airway Patency:  Patent     Post Op Vitals Reviewed: Yes    No anethesia notable event occurred.    Staff: Anesthesiologist, CRNA               /80 (04/17/24 1300)    Temp 97.6 °F (36.4 °C) (04/17/24 1300)    Pulse 70 (04/17/24 1300)   Resp 14 (04/17/24 1300)    SpO2

## 2024-04-17 NOTE — PERIOPERATIVE NURSING NOTE
Pt met d/c criteria. IV removed without difficulties. AVS reviewed verbally and written with pt and wife, both receptive. All questions answered.

## 2024-04-18 ENCOUNTER — NURSE TRIAGE (OUTPATIENT)
Age: 44
End: 2024-04-18

## 2024-04-18 DIAGNOSIS — N20.0 KIDNEY STONE: Primary | ICD-10-CM

## 2024-04-18 NOTE — TELEPHONE ENCOUNTER
Juan Antonio Zamora PA-C        Patient only needs to take Flomax as long as stent is in place.  Stent will be removed in the next 2 days per Dr. Odonnell's documentation so consider doing it on Sunday.  Patient should utilize antibiotic the day before, day of, and the day after stent is pulled.  Okay to provide work note until Tuesday as requested       Call placed to patient and spoke with him. Informed him of the AP recommendations. And instructions. Pt states he will try and remove his own stent at home on Sunday, but if he does not feel comfortable he would like an appointment for next week.   Office will contact him on Monday to see how he is doing and to FU if stent was removed.    Pt is aware he can contact the office with any questions or concerns he should have.

## 2024-04-18 NOTE — TELEPHONE ENCOUNTER
Pt called with several questions following surgery. Pt asking if he should still be taking the flomax, reports only has 5 pills left. Pt having a lot of pain and discomfort with urination especially at tip of penis, asking if this is normal. Did advise sometimes normal with stent. Pt also asking about a note for work, reports thought he would be able to go back today but due to the discomfort doesn't think he can. Asking if he can get a note to return on Tuesday to be safe. Please advise

## 2024-04-19 NOTE — TELEPHONE ENCOUNTER
Patient called inquiring of stone that was removed was sent for analysis also inquiring as to whether the needs a post op f/u appt.

## 2024-04-22 NOTE — TELEPHONE ENCOUNTER
Spoke with pt and advised the following:    Juan Antonio Zamora PA-C Physician Assistant Signed 10:45 AM     Copy     Please call patient to inform him that I have put order in for stone analysis.  He can drop off the stone at any [facility] urology office and we will send it out for testing.  Our clerical staff provided work note/letter through [employer]             Pt also asked what should he do regarding the dull achy feeling in left kidney. He said stent was removed on Sunday.    Pt states pain rating scale is a 2. I advised pt to take ibuprofen and or tylenol as needed for discomfort. Pt was in agreement with this plan.

## 2024-04-22 NOTE — TELEPHONE ENCOUNTER
Please call patient to inform him that I have put order in for stone analysis.  He can drop off the stone at any [facility] urology office and we will send it out for testing.  Our clerical staff provided work note/letter through [employer]

## 2024-04-22 NOTE — TELEPHONE ENCOUNTER
Patient called in again inquiring if the stones removed during his surgery were sent for analysis. Patient passed some fragments over weekend and saved them, so if the stones removed during Sx were not sent for analysis, patient would like to have the fragments tested.      Patient still hasn't received return to work letter in [facility]. Returns tomorrow.     Patient has had a dull achy feeling in left kidney area since stent removed by his wife on Sunday. Denies fever, chills, hematuria or any other issues. Advised to stay well hydrated and monitor pain levels.

## 2024-04-23 ENCOUNTER — TELEPHONE (OUTPATIENT)
Dept: UROLOGY | Facility: AMBULATORY SURGERY CENTER | Age: 44
End: 2024-04-23

## 2024-04-23 NOTE — TELEPHONE ENCOUNTER
Patient requested work note for days missed - current note has the dates of 4/19/24 until today returning 4/23/24.     The dates should be 4/17/24 when surgery was done until today 4/23/24 when patient returned.      Please update note with correct dates.

## 2024-04-23 NOTE — TELEPHONE ENCOUNTER
Patient called for letter to be updated to the following:  The dates should be 4/17/24 when surgery was done until today 4/23/24 when patient returned.    I messaged the office and they will update letter.        Call [phone]

## 2024-04-25 ENCOUNTER — DOCUMENTATION (OUTPATIENT)
Dept: DERMATOLOGY | Facility: CLINIC | Age: 44
End: 2024-04-25

## 2024-04-25 ENCOUNTER — OFFICE VISIT (OUTPATIENT)
Dept: DERMATOLOGY | Facility: CLINIC | Age: 44
End: 2024-04-25
Payer: COMMERCIAL

## 2024-04-25 VITALS — WEIGHT: 161 LBS | HEIGHT: 71 IN | BODY MASS INDEX: 22.54 KG/M2 | TEMPERATURE: 97.2 F

## 2024-04-25 DIAGNOSIS — N20.1 LEFT URETERAL STONE: ICD-10-CM

## 2024-04-25 DIAGNOSIS — L82.1 SEBORRHEIC KERATOSES: ICD-10-CM

## 2024-04-25 DIAGNOSIS — L98.9 SKIN LESION OF SCALP: Primary | ICD-10-CM

## 2024-04-25 DIAGNOSIS — N20.0 KIDNEY STONE: Primary | ICD-10-CM

## 2024-04-25 DIAGNOSIS — Z87.442 HISTORY OF KIDNEY STONES: Primary | ICD-10-CM

## 2024-04-25 DIAGNOSIS — L21.9 SEBORRHEIC DERMATITIS: ICD-10-CM

## 2024-04-25 PROCEDURE — 82360 CALCULUS ASSAY QUANT: CPT | Performed by: UROLOGY

## 2024-04-25 PROCEDURE — 99244 OFF/OP CNSLTJ NEW/EST MOD 40: CPT | Performed by: DERMATOLOGY

## 2024-04-25 RX ORDER — KETOCONAZOLE 20 MG/G
CREAM TOPICAL
Qty: 60 G | Refills: 5 | Status: SHIPPED | OUTPATIENT
Start: 2024-04-25

## 2024-04-25 RX ORDER — KETOCONAZOLE 20 MG/ML
SHAMPOO TOPICAL
Qty: 100 ML | Refills: 10 | Status: SHIPPED | OUTPATIENT
Start: 2024-04-25

## 2024-04-25 RX ORDER — CLOBETASOL PROPIONATE 0.46 MG/ML
SOLUTION TOPICAL 2 TIMES DAILY
Qty: 50 ML | Refills: 2 | Status: SHIPPED | OUTPATIENT
Start: 2024-04-25

## 2024-04-25 NOTE — PROGRESS NOTES
Unable to create cosmetic visit; no charge.     PROCEDURE:  DESTRUCTION OF BENIGN LESIONS WITH CRYOTHERAPY  After a thorough discussion of treatment options and risk/benefits/alternatives (including but not limited to local pain, scarring, dyspigmentation, blistering, and possible superinfection), verbal and written consent were obtained and the aforementioned lesions were treated on with cryotherapy using liquid nitrogen x 1 cycle for 5-10 seconds.    TOTAL NUMBER of 2 lesions were treated today on the ANATOMIC LOCATION: left temple.    The patient tolerated the procedure well, and after-care instructions were provided.

## 2024-04-25 NOTE — PROGRESS NOTES
Patient presented with stone fragments.  One sent for analysis.  Wants 24-hour urine to prevent new stones.  Offered brief visit.  Prefers to just have us order the stone analysis and 24-hour urine and then will have a follow-up visit.

## 2024-04-25 NOTE — PROGRESS NOTES
"[facility] Note     Patient Name: aMyur Alba  Encounter Date: 04/25/24     Have you been cared for by a [facility] Dermatologist in the last 3 years and, if so, which description applies to you?    NO.   I am considered a \"new\" patient and must complete all patient intake questions. I am MALE/not capable of bearing children.    REVIEW OF SYSTEMS:  Have you recently had or currently have any of the following? Recent fever or chills? No  Any non-healing wound? No   PAST MEDICAL HISTORY:  Have you personally ever had or currently have any of the following?  If \"YES,\" then please provide more detail. Skin cancer (such as Melanoma, Basal Cell Carcinoma, Squamous Cell Carcinoma?  No  Tuberculosis, HIV/AIDS, Hepatitis B or C: No  Radiation Treatment No   HISTORY OF IMMUNOSUPPRESSION:   Do you have a history of any of the following:  Systemic Immunosuppression such as Diabetes, Biologic or Immunotherapy, Chemotherapy, Organ Transplantation, Bone Marrow Transplantation?  No     Answering \"YES\" requires the addition of the dotphrase \"IMMUNOSUPPRESSED\" as the first diagnosis of the patient's visit.   FAMILY HISTORY:  Any \"first degree relatives\" (parent, brother, sister, or child) with the following?    Skin Cancer, Pancreatic or Other Cancer? No   PATIENT EXPERIENCE:    Do you want the Dermatologist to perform a COMPLETE skin exam today including a clinical examination under the \"bra and underwear\" areas?  NO, all ears examined except for under the underwear   If necessary, do we have your permission to call and leave a detailed message on your Preferred Phone number that includes your specific medical information?  NO      Allergies   Allergen Reactions    Tilactase Other (See Comments) and GI Intolerance     Skin issues       Current Outpatient Medications:     hydrocortisone (ANUSOL-HC) 2.5 % rectal cream, Apply topically 2 (two) times a day, Disp: 100 g, Rfl: 3    hydrocortisone (ANUSOL-HC) 25 mg " suppository, Insert 1 suppository (25 mg total) into the rectum 2 (two) times a day, Disp: 12 suppository, Rfl: 0    ibuprofen (MOTRIN) 600 mg tablet, Take 1 tablet (600 mg total) by mouth every 6 (six) hours as needed for mild pain, Disp: 20 tablet, Rfl: 0    Misc Natural Products (FIBER 7 PO), Take by mouth, Disp: , Rfl:     oxyCODONE (ROXICODONE) 5 immediate release tablet, Take 1 tablet (5 mg total) by mouth every 6 (six) hours as needed for moderate pain Max Daily Amount: 20 mg, Disp: 8 tablet, Rfl: 0    oxyCODONE (Roxicodone) 5 immediate release tablet, Take 1 tablet (5 mg total) by mouth every 4 (four) hours as needed for moderate pain for up to 6 doses Max Daily Amount: 30 mg, Disp: 6 tablet, Rfl: 0    Probiotic Product (UP4 PROBIOTICS MENS PO), Take by mouth, Disp: , Rfl:     tamsulosin (FLOMAX) 0.4 mg, Take 1 capsule (0.4 mg total) by mouth daily with dinner Do not start before April 13, 2024., Disp: 10 capsule, Rfl: 0          Whom besides the patient is providing clinical information about today's encounter?   NO ADDITIONAL HISTORIAN (patient alone provided history)    Physical Exam and Assessment/Plan by Diagnosis:    SKIN EXAM:  SEBORRHEIC KERATOSES-FULL BODY  - Relevant exam: Scattered over the trunk/extremities are waxy brown to black plaques and papules with stuck on appearance  - Exam and clinical history consistent with seborrheic keratoses  - Counseled that these are benign growths that do not require treatment  - Counseled that removal of lesions is considered cosmetic and so would incur a fee should patient elect to move forward.   - Patient to hold on treatments for now but will inform us should they desire additional treatments    MELANOCYTIC NEVI  -Relevant exam: Scattered over the trunk/extremities are homogenously pigmented brown macules and papules. ELM performed and without concerning findings.  - Exam and clinical history consistent with melanocytic nevi  - Educated on the ABCDE's of  melanoma; handout provided  - Counseled to return to clinic prior to scheduled appointment should any of these lesions change or should any new lesions of concern arise  - Counseled on use of sun protection daily. Reviewed latest FDA sunscreen guidelines, including use of broad spectrum (UVA and UVB blocking) sunscreen or sun protective clothing with SPF 30-50 every 2-3 hours and reapplied after exposure to water; use of photoprotective clothing, including a broad brim hat and UPF rated clothing if outdoors for several hours; avoid use of tanning beds as these pose significant risk for melanoma and skin cancer.    LENTIGINES  OTHER SKIN CHANGES DUE TO CHRONIC EXPOSURE TO NONIONIZING RADIATION  - Relevant exam: Over sun exposed areas are brown macules. ELM performed and without concerning findings.  - Exam and clinical history consistent with lentigines.  - Educated that these are indicative of prior sun exposure.   - Counseled to return to clinic prior to scheduled appointment should any of these lesions change or should any new lesions of concern arise.  - Recommended use of sunscreen as above and below.  - Counseled on use of sun protection daily. Reviewed latest FDA sunscreen guidelines, including use of broad spectrum (UVA and UVB blocking) sunscreen or sun protective clothing with SPF 30-50 every 2-3 hours and reapplied after exposure to water; use of photoprotective clothing, including a broad brim hat and UPF rated clothing if outdoors for several hours; avoid use of tanning beds as these pose significant risk for melanoma and skin cancer.    CHERRY ANGIOMAS  - Relevant exam: Scattered over the trunk/extremities are red papules  - Exam and clinical history consistent with cherry angiomas  - Educated that these are benign  - Educated that removal is considered aesthetic and would incur a fee.  - Patient does not wish to pursue removal at this time but will contact us should this change.    SEBORRHEIC  "DERMATITIS    Physical Exam:  Anatomic Location Affected:  face scalp  Morphological Description:  scale  Pertinent Positives:  Pertinent Negatives:    Additional History of Present Condition:      Assessment and Plan:  Based on a thorough discussion of this condition and the management approach to it (including a comprehensive discussion of the known risks, side effects and potential benefits of treatment), the patient (family) agrees to implement the following specific plan:    ketoconazole (NIZORAL) 2 % shampoo [[id number]]    Order Details  Dose, Route, Frequency: As Directed   Dispense Quantity: 100 mL Refills: 10          Sig: Daily for 2 weeks straight and then on \"Mondays, Wednesdays and Fridays\":  Lather into scalp and skin on face, neck, chest, and back; leave on for 5 minutes and then rinse off completely.     ketoconazole (NIZORAL) 2 % cream [[id number]]    Order Details  Dose, Route, Frequency: As Directed   Dispense Quantity: 60 g Refills: 5          Sig: Apply topically daily to affected skin on the face when flaring, and 3-4 times per week for maintenance when under good control     clobetasol (TEMOVATE) 0.05 % external solution [[id number]]    Order Details  Dose: -- Route: Topical Frequency: 2 times daily   Dispense Quantity: 50 mL Refills: 2          Sig: Apply topically 2 (two) times a day Apply topically to scalp ONLY twice a day AS NEEDED when flaring        Seborrheic Dermatitis   Seborrheic dermatitis is a common, chronic or relapsing form of eczema/dermatitis that mainly affects the sebaceous, gland-rich regions of the scalp, face, and trunk.  There are infantile and adult forms of seborrhoeic dermatitis. It is sometimes associated with psoriasis and, in that clinical scenario, may be referred to as \"sebo-psoriasis.\"  Seborrheic dermatitis is also known as \"seborrheic eczema.\"  Dandruff (also called \"pityriasis capitis\") is an uninflamed form of seborrhoeic dermatitis. Dandruff presents as " "bran-like scaly patches scattered within hair-bearing areas of the scalp.  In an infant, this condition may be referred to as \"cradle cap.\"  The cause of seborrheic dermatitis is not completely understood. It is associated with proliferation of various species of the skin commensal Malassezia, in its yeast (non-pathogenic) form. Its metabolites (such as the fatty acids oleic acid, malssezin, and indole-3-carbaldehyde) may cause an inflammatory reaction. Differences in skin barrier lipid content and function may account for individual presentations.    Infantile Seborrheic Dermatitis  Infantile seborrheic dermatitis affects babies under the age of 3 months and usually resolves by 6-12 months of age.  Infantile seborrheic dermatitis causes \"cradle cap\" (diffuse, greasy scaling on scalp). The rash may spread to affect armpit and groin folds (a type of \"napkin dermatitis\").  There may be associated salmon-pink colored patches that may flake or peel.  The rash in this case is usually not especially itchy, so the baby often appears undisturbed by the rash, even when more generalized.    Adult Seborrheic Dermatitis  Adult seborrheic dermatitis tends to begin in late adolescence; prevalence is greatest in young adults and in the elderly. It is more common in males than in females.    The following factors are sometimes associated with severe adult seborrheic dermatitis:  Oily skin  Familial tendency to seborrhoeic dermatitis or a family history of psoriasis  Immunosuppression: organ transplant recipient, human immunodeficiency virus (HIV) infection and patients with lymphoma  Neurological and psychiatric diseases: Parkinson disease, tardive dyskinesia, depression, epilepsy, facial nerve palsy, spinal cord injury and congenital disorders such as Down syndrome  Treatment for psoriasis with psoralen and ultraviolet A (PUVA) therapy  Lack of sleep  Stressful events.    In adults, seborrheic dermatitis may typically affect the " scalp, face (creases around the nose, behind ears, within eyebrows) and upper trunk. Typical clinical features include:  Winter flares, improving in summer following sun exposure  Minimal itch most of the time  Combination oily and dry mid-facial skin  Ill-defined localized scaly patches or diffuse scale in the scalp  Blepharitis; scaly red eyelid margins  [place]-pink, thin, scaly, and ill-defined plaques in skin folds on both sides of the face  Petal or ring-shaped flaky patches on hair-line and on anterior chest  Rash in armpits, under the breasts, in the groin folds and genital creases  Superficial folliculitis (inflamed hair follicles) on cheeks and upper trunk    Seborrheic dermatitis is diagnosed by its clinical appearance and behavior. Skin biopsy may be helpful but is rarely necessary to make this diagnosis.        Scribe Attestation      I,:  Alisha Chatman am acting as a scribe while in the presence of the attending physician.:       I,:  Genoveva Blancas MD personally performed the services described in this documentation    as scribed in my presence.:            Patient was seen and discussed with Dr. Blancas.   DWAYNE Mccoy 04/25/24

## 2024-04-25 NOTE — PATIENT INSTRUCTIONS
SEBORRHEIC KERATOSES-FULL BODY  - Relevant exam: Scattered over the trunk/extremities are waxy brown to black plaques and papules with stuck on appearance  - Exam and clinical history consistent with seborrheic keratoses  - Counseled that these are benign growths that do not require treatment  - Counseled that removal of lesions is considered cosmetic and so would incur a fee should patient elect to move forward.   - Patient to hold on treatments for now but will inform us should they desire additional treatments    FOLLOW UP  SKIN EXAM AT HOME:  What is skin cancer?  Skin cancer is unfortunately very common. That's why we are here to help you on your journey to healthy happy skin! There are two main types of skin cancer: melanoma and non-melanoma skin cancer. Melanoma is a form of skin cancer that often arises within an existing nevus or mole. However, this is not always the case. Melanoma can arise anywhere (not only where you have moles right now). Melanoma can run in families, so letting us know about your family history is important. Non-melanoma skin cancer is the most common type of cancer in the [place]. The two main types of non-melanoma skin cancers are basal cell carcinomas (BCC) and squamous cell carcinoma (SCC). These cancers tend to be less aggressive than melanomas but are still important to look for and treat.    What can I do to prevent skin cancer?  One of the largest risk factors for skin cancer is sun exposure or UV radiation. Therefore, sun protection is stahl! Here are some great tips for protecting yourself!  Try to avoid direct sun exposure during peak sun hours (10 AM to 2 PM)  Remember you get A LOT of sun even under cloud coverage and through care windows!  When choosing a sunscreen, look for one that says “broad spectrum” sunscreen. This means it protects you from more of the harmful UV rays.   Choose a sunscreen that is SPF 30 or greater for best protection.   Apply sunscreen to all  sun-exposed skin and reapply every 2 hours.   Consider sun protective clothing! Great additions to your sun protective clothing wardrobe include broad brimmed hats, sunglasses, UPF clothing.  Avoid tanning salons. These have been shown to be very harmful in terms of your risk of skin cancer.   Avoid “base tans”. We now know that tans are dangerous (not just sun burns). If you want to have a tan for a trip, consider a spray tan!    Should I check my skin at home between my dermatology appointments?  Yes! It's always a great idea to look at your skin on a regular basis. Here are some things to look for when monitoring your skin.   For melanoma, look for the ABCDE's!  A = Asymmetry. Look for a spot where one half does not match the other!  B = Borders. Look for a spot that has jagged, ragged or irregular borders.  C = Color. Look for a spot that is not evenly colored and often includes multiple colors, especially true black, red, white, blue, grey.   D = Diameter. Look for a spot that is larger than the size of a pencil eraser.  E = Evolution. If you ever have a spot that is changing in shape, color, size or symptoms (becomes itchy, painful or starts to bleed), always call us!  For non-melanoma skin cancers, look for a new, pink spot that is not going away, especially one that is itchy, painful or bleeding.     What should I do if I see a spot that is concerning for melanoma or non-melanoma skin cancer?  If you are ever concerned, call us! Do not wait for your next appointment. We want to help!   .

## 2024-05-04 LAB
CALCIUM OXALATE DIHYDRATE MFR STONE IR: 60 %
COLOR STONE: NORMAL
COM MFR STONE: 40 %
COMMENT-STONE3: NORMAL
COMPOSITION: NORMAL
LABORATORY COMMENT REPORT: NORMAL
PHOTO: NORMAL
SIZE STONE: NORMAL MM
SPEC SOURCE SUBJ: NORMAL
STONE ANALYSIS-IMP: NORMAL
WT STONE: 8 MG

## 2024-05-22 ENCOUNTER — APPOINTMENT (OUTPATIENT)
Dept: RADIOLOGY | Facility: CLINIC | Age: 44
End: 2024-05-22
Payer: OTHER MISCELLANEOUS

## 2024-05-22 ENCOUNTER — OCCMED (OUTPATIENT)
Dept: URGENT CARE | Facility: CLINIC | Age: 44
End: 2024-05-22

## 2024-05-22 DIAGNOSIS — M79.641 RIGHT HAND PAIN: Primary | ICD-10-CM

## 2024-05-22 PROCEDURE — 73130 X-RAY EXAM OF HAND: CPT

## 2024-06-12 ENCOUNTER — OFFICE VISIT (OUTPATIENT)
Dept: FAMILY MEDICINE CLINIC | Facility: CLINIC | Age: 44
End: 2024-06-12
Payer: COMMERCIAL

## 2024-06-12 VITALS
HEIGHT: 71 IN | DIASTOLIC BLOOD PRESSURE: 80 MMHG | SYSTOLIC BLOOD PRESSURE: 120 MMHG | WEIGHT: 149.2 LBS | OXYGEN SATURATION: 98 % | BODY MASS INDEX: 20.89 KG/M2 | HEART RATE: 83 BPM | TEMPERATURE: 98.4 F | RESPIRATION RATE: 16 BRPM

## 2024-06-12 DIAGNOSIS — G89.29 GROIN PAIN, CHRONIC, RIGHT: Primary | ICD-10-CM

## 2024-06-12 DIAGNOSIS — R10.31 GROIN PAIN, CHRONIC, RIGHT: Primary | ICD-10-CM

## 2024-06-12 PROCEDURE — 99214 OFFICE O/P EST MOD 30 MIN: CPT | Performed by: FAMILY MEDICINE

## 2024-06-12 RX ORDER — MELOXICAM 7.5 MG/1
7.5 TABLET ORAL EVERY EVENING
COMMUNITY

## 2024-06-12 NOTE — PROGRESS NOTES
Chief Complaint   Patient presents with   • Follow-up     Patient had right side groin pain , thought is was a muscle but it did not go away . He ad an US dine in hospital for kidney stones and saw 2 hernias , he needs a referral to look into this further         Patient ID: Mayur Alba is a 43 y.o. male.    HPI  Pt is seeing for chronic R sided groin pain started over 6 m ago -  worsening with exercises -  had CT 2 m ago ( done for kidney stone eval ) that showed small fat containing umbilical and b/l inguinal hernias, L>R    The following portions of the patient's history were reviewed and updated as appropriate: allergies, current medications, past family history, past medical history, past social history, past surgical history and problem list.    Review of Systems   Constitutional: Negative.    HENT: Negative.     Respiratory: Negative.     Gastrointestinal: Negative.    Genitourinary: Negative.    Skin: Negative.        Current Outpatient Medications   Medication Sig Dispense Refill   • hydrocortisone (ANUSOL-HC) 2.5 % rectal cream Apply topically 2 (two) times a day 100 g 3   • meloxicam (MOBIC) 7.5 mg tablet Take 7.5 mg by mouth daily     • clobetasol (TEMOVATE) 0.05 % external solution Apply topically 2 (two) times a day Apply topically to scalp ONLY twice a day AS NEEDED when flaring (Patient not taking: Reported on 6/12/2024) 50 mL 2   • hydrocortisone (ANUSOL-HC) 25 mg suppository Insert 1 suppository (25 mg total) into the rectum 2 (two) times a day (Patient not taking: Reported on 6/12/2024) 12 suppository 0   • ibuprofen (MOTRIN) 600 mg tablet Take 1 tablet (600 mg total) by mouth every 6 (six) hours as needed for mild pain (Patient not taking: Reported on 6/12/2024) 20 tablet 0   • ketoconazole (NIZORAL) 2 % cream Apply topically daily to affected skin on the face when flaring, and 3-4 times per week for maintenance when under good control (Patient not taking: Reported on 6/12/2024) 60 g 5   •  "ketoconazole (NIZORAL) 2 % shampoo Daily for 2 weeks straight and then on \"Mondays, Wednesdays and Fridays\":  Lather into scalp and skin on face, neck, chest, and back; leave on for 5 minutes and then rinse off completely. (Patient not taking: Reported on 6/12/2024) 100 mL 10   • Misc Natural Products (FIBER 7 PO) Take by mouth (Patient not taking: Reported on 6/12/2024)     • oxyCODONE (ROXICODONE) 5 immediate release tablet Take 1 tablet (5 mg total) by mouth every 6 (six) hours as needed for moderate pain Max Daily Amount: 20 mg (Patient not taking: Reported on 6/12/2024) 8 tablet 0   • oxyCODONE (Roxicodone) 5 immediate release tablet Take 1 tablet (5 mg total) by mouth every 4 (four) hours as needed for moderate pain for up to 6 doses Max Daily Amount: 30 mg (Patient not taking: Reported on 6/12/2024) 6 tablet 0   • Probiotic Product (UP4 PROBIOTICS MENS PO) Take by mouth (Patient not taking: Reported on 6/12/2024)     • tamsulosin (FLOMAX) 0.4 mg Take 1 capsule (0.4 mg total) by mouth daily with dinner Do not start before April 13, 2024. (Patient not taking: Reported on 6/12/2024) 10 capsule 0     No current facility-administered medications for this visit.       Objective:    /80 (BP Location: Left arm, Patient Position: Sitting, Cuff Size: Standard)   Pulse 83   Temp 98.4 °F (36.9 °C)   Resp 16   Ht 5' 11\" (1.803 m)   Wt 67.7 kg (149 lb 3.2 oz)   SpO2 98%   BMI 20.81 kg/m²        Physical Exam  Constitutional:       General: He is not in acute distress.     Appearance: He is not ill-appearing.   Cardiovascular:      Rate and Rhythm: Normal rate.   Pulmonary:      Effort: Pulmonary effort is normal. No respiratory distress.   Abdominal:      Palpations: Abdomen is soft. There is no mass.      Tenderness: There is no abdominal tenderness.      Hernia: No hernia is present.   Neurological:      Mental Status: He is alert.                 Assessment/Plan:         Diagnoses and all orders for this " visit:    Groin pain, chronic, right  -     Ambulatory Referral to General Surgery; Future    Rto prn                             Jamaica Srivastava MD

## 2024-06-17 ENCOUNTER — TELEPHONE (OUTPATIENT)
Dept: SURGERY | Facility: CLINIC | Age: 44
End: 2024-06-17

## 2024-06-17 NOTE — TELEPHONE ENCOUNTER
Left message for patient to call office.  Has appointment in [place] office @ 2:30 on 6/18/2024.  Called to see if patient wanted to be seen in [place] office today.  To call back.

## 2024-06-17 NOTE — PROGRESS NOTES
"[facility] History and Physical Note:    Assessment:  Small, less than 1 cm, fascial defect at the umbilicus.  Left greater than right groin bulges on exam and most likely reflecting small hernias.  Could also represent cord lipomas.  Patient understands that his groin pain complaint could persist following surgery.    Pertinent labs reviewed  Rewed CMP and CBC from 12 April 2024  Pertinent images and available reads personally reviewed  Reviewed CT images and report from April 2024  Pertinent notes reviewed  I reviewed the last PCP note by Dr. Berumen    Plan:  1.  Reviewed CMP and CBC from 12 April 2024.  As well as CT images and report.  2.  Plan laparoscopic (TEP) bilateral inguinal hernia repair.  We will also repair his small umbilical defect at that time.    Chief Complaint:  \"I am here for the hernias\"    HPI    Patient is a 43-year-old gentleman who recently saw his PCP, Dr. Berumen, for right groin pain and referred to my office for further evaluation.  He reports right groin discomfort that is intermittent over the last 6 months or so.  This discomfort is worse with certain exercises.  A CT abdomen and pelvis with renal stone protocol was previously performed in April 2024 and this revealed:  ABDOMINAL WALL/INGUINAL REGIONS: Small fat-containing umbilical hernia. Small fat-containing bilateral inguinal hernias, left larger than right.  BONES: No acute fracture or suspicious osseous lesion.  IMPRESSION:  5 mm left distal ureteral calculus with mild upstream hydronephroureterosis. No perinephric collection.  3 mm right renal nonobstructing calculus.     His left distal ureteral calculus has been treated by his urologist, Dr. Odonnell.      PMH:  Past Medical History:   Diagnosis Date    Cervical disc herniation     6/2/2014 per [employer]       PSH:  Past Surgical History:   Procedure Laterality Date    FL RETROGRADE PYELOGRAM  4/17/2024    HERNIA REPAIR      [place] CYSTO/URETERO " "W/LITHOTRIPSY &INDWELL STENT INSRT Left 4/17/2024    Procedure: CYSTOSCOPY URETEROSCOPY WITH LITHOTRIPSY HOLMIUM LASER, RETROGRADE PYELOGRAM AND INSERTION STENT URETERAL;  Surgeon: Silas Odonnell MD;  Location: [facility];  Service: Urology       Home Meds:  Current Outpatient Medications on File Prior to Visit   Medication Sig Dispense Refill    clobetasol (TEMOVATE) 0.05 % external solution Apply topically 2 (two) times a day Apply topically to scalp ONLY twice a day AS NEEDED when flaring (Patient not taking: Reported on 6/12/2024) 50 mL 2    hydrocortisone (ANUSOL-HC) 2.5 % rectal cream Apply topically 2 (two) times a day 100 g 3    hydrocortisone (ANUSOL-HC) 25 mg suppository Insert 1 suppository (25 mg total) into the rectum 2 (two) times a day (Patient not taking: Reported on 6/12/2024) 12 suppository 0    ibuprofen (MOTRIN) 600 mg tablet Take 1 tablet (600 mg total) by mouth every 6 (six) hours as needed for mild pain (Patient not taking: Reported on 6/12/2024) 20 tablet 0    ketoconazole (NIZORAL) 2 % cream Apply topically daily to affected skin on the face when flaring, and 3-4 times per week for maintenance when under good control (Patient not taking: Reported on 6/12/2024) 60 g 5    ketoconazole (NIZORAL) 2 % shampoo Daily for 2 weeks straight and then on \"Mondays, Wednesdays and Fridays\":  Lather into scalp and skin on face, neck, chest, and back; leave on for 5 minutes and then rinse off completely. (Patient not taking: Reported on 6/12/2024) 100 mL 10    meloxicam (MOBIC) 7.5 mg tablet Take 7.5 mg by mouth daily      Misc Natural Products (FIBER 7 PO) Take by mouth (Patient not taking: Reported on 6/12/2024)      oxyCODONE (ROXICODONE) 5 immediate release tablet Take 1 tablet (5 mg total) by mouth every 6 (six) hours as needed for moderate pain Max Daily Amount: 20 mg (Patient not taking: Reported on 6/12/2024) 8 tablet 0    oxyCODONE (Roxicodone) 5 immediate release tablet Take 1 tablet (5 mg " total) by mouth every 4 (four) hours as needed for moderate pain for up to 6 doses Max Daily Amount: 30 mg (Patient not taking: Reported on 2024) 6 tablet 0    Probiotic Product (UP4 PROBIOTICS MENS PO) Take by mouth (Patient not taking: Reported on 2024)      tamsulosin (FLOMAX) 0.4 mg Take 1 capsule (0.4 mg total) by mouth daily with dinner Do not start before 2024. (Patient not taking: Reported on 2024) 10 capsule 0     No current facility-administered medications on file prior to visit.       Allergies:  Allergies   Allergen Reactions    Tilactase Other (See Comments) and GI Intolerance     Skin issues        Social Hx:  Social History     Socioeconomic History    Marital status: /Civil Union     Spouse name: Not on file    Number of children: Not on file    Years of education: Not on file    Highest education level: Not on file   Occupational History    Not on file   Tobacco Use    Smoking status: Former     Current packs/day: 0.00     Types: Cigarettes     Start date: 1993     Quit date: 2010     Years since quittin.5    Smokeless tobacco: Never   Vaping Use    Vaping status: Never Used   Substance and Sexual Activity    Alcohol use: No    Drug use: No    Sexual activity: Yes     Partners: Female   Other Topics Concern    Not on file   Social History Narrative    Not on file     Social Determinants of Health     Financial Resource Strain: Not on file   Food Insecurity: Not on file   Transportation Needs: Not on file   Physical Activity: Not on file   Stress: Not on file   Social Connections: Not on file   Intimate Partner Violence: Not on file   Housing Stability: Not on file        Family Hx:    Family History   Problem Relation Age of Onset    Hyperlipidemia Mother     Hypertension Mother     Atrial fibrillation Father     COPD Maternal Grandmother     Emphysema Maternal Grandmother     Uveitis Maternal Grandmother     Aortic aneurysm Maternal Grandmother     No  Known Problems Sister     No Known Problems Brother     No Known Problems Maternal Aunt     No Known Problems Maternal Uncle     No Known Problems Paternal Aunt     No Known Problems Paternal Uncle     No Known Problems Maternal Grandfather     No Known Problems Paternal Grandmother     No Known Problems Paternal Grandfather     ADD / ADHD Neg Hx     Anesthesia problems Neg Hx     Cancer Neg Hx     Clotting disorder Neg Hx     Collagen disease Neg Hx     Diabetes Neg Hx     Dislocations Neg Hx     Learning disabilities Neg Hx     Neurological problems Neg Hx     Osteoporosis Neg Hx     Rheumatologic disease Neg Hx     Scoliosis Neg Hx     Vascular Disease Neg Hx          Review of Systems   Constitutional:  Negative for chills and fever.   Respiratory: Negative.     Cardiovascular: Negative.    Gastrointestinal: Negative.    Genitourinary:         The HPI past medical history   Musculoskeletal: Negative.    Neurological: Negative.    Hematological: Negative.    All other systems reviewed and are negative.      There were no vitals taken for this visit.    Physical Exam  Vitals reviewed.   Constitutional:       General: He is not in acute distress.     Appearance: Normal appearance.   HENT:      Head: Normocephalic and atraumatic.   Cardiovascular:      Rate and Rhythm: Normal rate and regular rhythm.   Pulmonary:      Effort: Pulmonary effort is normal.      Breath sounds: Normal breath sounds.   Abdominal:      General: Abdomen is flat. There is no distension.      Palpations: Abdomen is soft.      Comments: Small umbilical defect, less than 1 cm.  Mild bulge to Valsalva left groin greater than right groin.  This could represent small fat-containing inguinal hernias versus cord lipomas.   Musculoskeletal:      Cervical back: Normal range of motion and neck supple.   Lymphadenopathy:      Cervical: No cervical adenopathy.   Skin:     General: Skin is warm and dry.   Neurological:      General: No focal deficit  present.      Mental Status: He is alert.         Pertinent labs reviewed  Rewed CMP and CBC from 12 April 2024  Pertinent images and available reads personally reviewed  Reviewed CT images and report from April 2024  Pertinent notes reviewed  I reviewed the last PCP note by Dr. Jamaica Christine MD FACS  [facility]  [phone]

## 2024-06-18 ENCOUNTER — CONSULT (OUTPATIENT)
Dept: SURGERY | Facility: CLINIC | Age: 44
End: 2024-06-18
Payer: COMMERCIAL

## 2024-06-18 VITALS
WEIGHT: 152.6 LBS | HEART RATE: 69 BPM | HEIGHT: 71 IN | BODY MASS INDEX: 21.36 KG/M2 | SYSTOLIC BLOOD PRESSURE: 132 MMHG | OXYGEN SATURATION: 97 % | DIASTOLIC BLOOD PRESSURE: 74 MMHG | TEMPERATURE: 98 F

## 2024-06-18 DIAGNOSIS — K42.9 UMBILICAL HERNIA: ICD-10-CM

## 2024-06-18 DIAGNOSIS — K40.20 NON-RECURRENT BILATERAL INGUINAL HERNIA WITHOUT OBSTRUCTION OR GANGRENE: Primary | ICD-10-CM

## 2024-06-18 DIAGNOSIS — R10.31 GROIN PAIN, CHRONIC, RIGHT: ICD-10-CM

## 2024-06-18 DIAGNOSIS — G89.29 GROIN PAIN, CHRONIC, RIGHT: ICD-10-CM

## 2024-06-18 PROCEDURE — 99244 OFF/OP CNSLTJ NEW/EST MOD 40: CPT | Performed by: SURGERY

## 2024-06-18 NOTE — H&P
"[facility] History and Physical Note:    Assessment:  Small, less than 1 cm, fascial defect at the umbilicus.  Left greater than right groin bulges on exam and most likely reflecting small hernias.  Could also represent cord lipomas.  Patient understands that his groin pain complaint could persist following surgery.    Pertinent labs reviewed  Rewed CMP and CBC from 12 April 2024  Pertinent images and available reads personally reviewed  Reviewed CT images and report from April 2024  Pertinent notes reviewed  I reviewed the last PCP note by Dr. Berumen    Plan:  1.  Reviewed CMP and CBC from 12 April 2024.  As well as CT images and report.  2.  Plan laparoscopic (TEP) bilateral inguinal hernia repair.  We will also repair his small umbilical defect at that time.    Chief Complaint:  \"I am here for the hernias\"    HPI    Patient is a 43-year-old gentleman who recently saw his PCP, Dr. Berumen, for right groin pain and referred to my office for further evaluation.  He reports right groin discomfort that is intermittent over the last 6 months or so.  This discomfort is worse with certain exercises.  A CT abdomen and pelvis with renal stone protocol was previously performed in April 2024 and this revealed:  ABDOMINAL WALL/INGUINAL REGIONS: Small fat-containing umbilical hernia. Small fat-containing bilateral inguinal hernias, left larger than right.  BONES: No acute fracture or suspicious osseous lesion.  IMPRESSION:  5 mm left distal ureteral calculus with mild upstream hydronephroureterosis. No perinephric collection.  3 mm right renal nonobstructing calculus.     His left distal ureteral calculus has been treated by his urologist, Dr. Odonnell.      PMH:  Past Medical History:   Diagnosis Date    Cervical disc herniation     6/2/2014 per [employer]       PSH:  Past Surgical History:   Procedure Laterality Date    FL RETROGRADE PYELOGRAM  4/17/2024    HERNIA REPAIR      [place] CYSTO/URETERO " "W/LITHOTRIPSY &INDWELL STENT INSRT Left 4/17/2024    Procedure: CYSTOSCOPY URETEROSCOPY WITH LITHOTRIPSY HOLMIUM LASER, RETROGRADE PYELOGRAM AND INSERTION STENT URETERAL;  Surgeon: Silas Odonnell MD;  Location: [facility];  Service: Urology       Home Meds:  Current Outpatient Medications on File Prior to Visit   Medication Sig Dispense Refill    clobetasol (TEMOVATE) 0.05 % external solution Apply topically 2 (two) times a day Apply topically to scalp ONLY twice a day AS NEEDED when flaring (Patient not taking: Reported on 6/12/2024) 50 mL 2    hydrocortisone (ANUSOL-HC) 2.5 % rectal cream Apply topically 2 (two) times a day 100 g 3    hydrocortisone (ANUSOL-HC) 25 mg suppository Insert 1 suppository (25 mg total) into the rectum 2 (two) times a day (Patient not taking: Reported on 6/12/2024) 12 suppository 0    ibuprofen (MOTRIN) 600 mg tablet Take 1 tablet (600 mg total) by mouth every 6 (six) hours as needed for mild pain (Patient not taking: Reported on 6/12/2024) 20 tablet 0    ketoconazole (NIZORAL) 2 % cream Apply topically daily to affected skin on the face when flaring, and 3-4 times per week for maintenance when under good control (Patient not taking: Reported on 6/12/2024) 60 g 5    ketoconazole (NIZORAL) 2 % shampoo Daily for 2 weeks straight and then on \"Mondays, Wednesdays and Fridays\":  Lather into scalp and skin on face, neck, chest, and back; leave on for 5 minutes and then rinse off completely. (Patient not taking: Reported on 6/12/2024) 100 mL 10    meloxicam (MOBIC) 7.5 mg tablet Take 7.5 mg by mouth daily      Misc Natural Products (FIBER 7 PO) Take by mouth (Patient not taking: Reported on 6/12/2024)      oxyCODONE (ROXICODONE) 5 immediate release tablet Take 1 tablet (5 mg total) by mouth every 6 (six) hours as needed for moderate pain Max Daily Amount: 20 mg (Patient not taking: Reported on 6/12/2024) 8 tablet 0    oxyCODONE (Roxicodone) 5 immediate release tablet Take 1 tablet (5 mg " total) by mouth every 4 (four) hours as needed for moderate pain for up to 6 doses Max Daily Amount: 30 mg (Patient not taking: Reported on 2024) 6 tablet 0    Probiotic Product (UP4 PROBIOTICS MENS PO) Take by mouth (Patient not taking: Reported on 2024)      tamsulosin (FLOMAX) 0.4 mg Take 1 capsule (0.4 mg total) by mouth daily with dinner Do not start before 2024. (Patient not taking: Reported on 2024) 10 capsule 0     No current facility-administered medications on file prior to visit.       Allergies:  Allergies   Allergen Reactions    Tilactase Other (See Comments) and GI Intolerance     Skin issues        Social Hx:  Social History     Socioeconomic History    Marital status: /Civil Union     Spouse name: Not on file    Number of children: Not on file    Years of education: Not on file    Highest education level: Not on file   Occupational History    Not on file   Tobacco Use    Smoking status: Former     Current packs/day: 0.00     Types: Cigarettes     Start date: 1993     Quit date: 2010     Years since quittin.5    Smokeless tobacco: Never   Vaping Use    Vaping status: Never Used   Substance and Sexual Activity    Alcohol use: No    Drug use: No    Sexual activity: Yes     Partners: Female   Other Topics Concern    Not on file   Social History Narrative    Not on file     Social Determinants of Health     Financial Resource Strain: Not on file   Food Insecurity: Not on file   Transportation Needs: Not on file   Physical Activity: Not on file   Stress: Not on file   Social Connections: Not on file   Intimate Partner Violence: Not on file   Housing Stability: Not on file        Family Hx:    Family History   Problem Relation Age of Onset    Hyperlipidemia Mother     Hypertension Mother     Atrial fibrillation Father     COPD Maternal Grandmother     Emphysema Maternal Grandmother     Uveitis Maternal Grandmother     Aortic aneurysm Maternal Grandmother     No  Known Problems Sister     No Known Problems Brother     No Known Problems Maternal Aunt     No Known Problems Maternal Uncle     No Known Problems Paternal Aunt     No Known Problems Paternal Uncle     No Known Problems Maternal Grandfather     No Known Problems Paternal Grandmother     No Known Problems Paternal Grandfather     ADD / ADHD Neg Hx     Anesthesia problems Neg Hx     Cancer Neg Hx     Clotting disorder Neg Hx     Collagen disease Neg Hx     Diabetes Neg Hx     Dislocations Neg Hx     Learning disabilities Neg Hx     Neurological problems Neg Hx     Osteoporosis Neg Hx     Rheumatologic disease Neg Hx     Scoliosis Neg Hx     Vascular Disease Neg Hx          Review of Systems   Constitutional:  Negative for chills and fever.   Respiratory: Negative.     Cardiovascular: Negative.    Gastrointestinal: Negative.    Genitourinary:         The HPI past medical history   Musculoskeletal: Negative.    Neurological: Negative.    Hematological: Negative.    All other systems reviewed and are negative.      There were no vitals taken for this visit.    Physical Exam  Vitals reviewed.   Constitutional:       General: He is not in acute distress.     Appearance: Normal appearance.   HENT:      Head: Normocephalic and atraumatic.   Cardiovascular:      Rate and Rhythm: Normal rate and regular rhythm.   Pulmonary:      Effort: Pulmonary effort is normal.      Breath sounds: Normal breath sounds.   Abdominal:      General: Abdomen is flat. There is no distension.      Palpations: Abdomen is soft.      Comments: Small umbilical defect, less than 1 cm.  Mild bulge to Valsalva left groin greater than right groin.  This could represent small fat-containing inguinal hernias versus cord lipomas.   Musculoskeletal:      Cervical back: Normal range of motion and neck supple.   Lymphadenopathy:      Cervical: No cervical adenopathy.   Skin:     General: Skin is warm and dry.   Neurological:      General: No focal deficit  present.      Mental Status: He is alert.         Pertinent labs reviewed  Rewed CMP and CBC from 12 April 2024  Pertinent images and available reads personally reviewed  Reviewed CT images and report from April 2024  Pertinent notes reviewed  I reviewed the last PCP note by Dr. Jamaica Christine MD FACS  [facility]  [phone]

## 2024-06-19 ENCOUNTER — ANESTHESIA EVENT (OUTPATIENT)
Dept: PERIOP | Facility: HOSPITAL | Age: 44
End: 2024-06-19
Payer: COMMERCIAL

## 2024-06-19 NOTE — PRE-PROCEDURE INSTRUCTIONS
Pre-Surgery Instructions:   Medication Instructions    hydrocortisone (ANUSOL-HC) 2.5 % rectal cream Hold day of surgery.    meloxicam (MOBIC) 7.5 mg tablet Stop taking 3 days prior to surgery.    Medication instructions for day surgery reviewed. Please use only a sip of water to take your instructed medications. Avoid all over the counter vitamins, supplements and NSAIDS for one week prior to surgery per anesthesia guidelines. Tylenol is ok to take as needed.     You will receive a call one business day prior to surgery with an arrival time and hospital directions. If your surgery is scheduled on a Monday, the hospital will be calling you on the Friday prior to your surgery. If you have not heard from anyone by 8pm, please call the hospital supervisor through the hospital  at [phone]. ([place] [phone] or [place] [phone]).    Do not eat or drink anything after midnight the night before your surgery, including candy, mints, lifesavers, or chewing gum. Do not drink alcohol 24hrs before your surgery. Try not to smoke at least 24hrs before your surgery.       Follow the pre surgery showering instructions as listed in the “My Surgical Experience Booklet” or otherwise provided by your surgeon's office. Do not use a blade to shave the surgical area 1 week before surgery. It is okay to use a clean electric clippers up to 24 hours before surgery. Do not apply any lotions, creams, including makeup, cologne, deodorant, or perfumes after showering on the day of your surgery. Do not use dry shampoo, hair spray, hair gel, or any type of hair products.     No contact lenses, eye make-up, or artificial eyelashes. Remove nail polish, including gel polish, and any artificial, gel, or acrylic nails if possible. Remove all jewelry including rings and body piercing jewelry.     Wear causal clothing that is easy to take on and off. Consider your type of surgery.    Keep any valuables, jewelry, piercings at home.  Please bring any specially ordered equipment (sling, braces) if indicated.    Arrange for a responsible person to drive you to and from the hospital on the day of your surgery. Please confirm the visitor policy for the day of your procedure when you receive your phone call with an arrival time.     Call the surgeon's office with any new illnesses, exposures, or additional questions prior to surgery.    Please reference your “My Surgical Experience Booklet” for additional information to prepare for your upcoming surgery.

## 2024-06-24 ENCOUNTER — TELEPHONE (OUTPATIENT)
Age: 44
End: 2024-06-24

## 2024-06-24 NOTE — TELEPHONE ENCOUNTER
Spoke with patient, has surgery scheduled for 7/12/2024.  Patient would like to reschedule for sometime in October or November.  Will have surgical coordinator call patient tomorrow.

## 2024-06-25 ENCOUNTER — TELEPHONE (OUTPATIENT)
Dept: SURGERY | Facility: CLINIC | Age: 44
End: 2024-06-25

## 2024-07-05 ENCOUNTER — ANESTHESIA (OUTPATIENT)
Dept: PERIOP | Facility: HOSPITAL | Age: 44
End: 2024-07-05
Payer: COMMERCIAL

## 2024-07-15 ENCOUNTER — TELEPHONE (OUTPATIENT)
Age: 44
End: 2024-07-15

## 2024-07-15 ENCOUNTER — TELEPHONE (OUTPATIENT)
Dept: SURGERY | Facility: CLINIC | Age: 44
End: 2024-07-15

## 2024-07-15 NOTE — TELEPHONE ENCOUNTER
Pt calling to confirm that his appt w/ Dr. Breaux 7/17/24 was canceled. I informed pt that it was canceled via seedtagt. Per pt his wife did it and thank you.

## 2024-07-15 NOTE — TELEPHONE ENCOUNTER
I spoke with patient in reference to the letter patient received about his surgery. I explained to patient that I will get a new auth closer to surgery

## 2024-07-15 NOTE — TELEPHONE ENCOUNTER
Patient calling stating he received a letter regarding his authorization for surgery with  in October.     Timbo transferred call to Rosey the surgical coordinator.   
home

## 2024-08-05 ENCOUNTER — TELEPHONE (OUTPATIENT)
Dept: SURGERY | Facility: CLINIC | Age: 44
End: 2024-08-05

## 2024-10-23 NOTE — PRE-PROCEDURE INSTRUCTIONS
Pre-Surgery Instructions:   Medication Instructions    hydrocortisone (ANUSOL-HC) 2.5 % rectal cream Hold day of surgery.      Medication instructions for day surgery reviewed. Please use only a sip of water to take your instructed medications. Avoid all over the counter vitamins, supplements and NSAIDS for one week prior to surgery per anesthesia guidelines. Tylenol is ok to take as needed.     You will receive a call one business day prior to surgery with an arrival time and hospital directions. If your surgery is scheduled on a Monday, the hospital will be calling you on the Friday prior to your surgery. If you have not heard from anyone by 8pm, please call the hospital supervisor through the hospital  at [phone]. ([place] [phone] or [place] [phone]).    Do not eat or drink anything after midnight the night before your surgery, including candy, mints, lifesavers, or chewing gum. Do not drink alcohol 24hrs before your surgery. Try not to smoke at least 24hrs before your surgery.       Follow the pre surgery showering instructions as listed in the “My Surgical Experience Booklet” or otherwise provided by your surgeon's office. Do not use a blade to shave the surgical area 1 week before surgery. It is okay to use a clean electric clippers up to 24 hours before surgery. Do not apply any lotions, creams, including makeup, cologne, deodorant, or perfumes after showering on the day of your surgery. Do not use dry shampoo, hair spray, hair gel, or any type of hair products.     No contact lenses, eye make-up, or artificial eyelashes. Remove nail polish, including gel polish, and any artificial, gel, or acrylic nails if possible. Remove all jewelry including rings and body piercing jewelry.     Wear causal clothing that is easy to take on and off. Consider your type of surgery.    Keep any valuables, jewelry, piercings at home. Please bring any specially ordered equipment (sling, braces) if  indicated.    Arrange for a responsible person to drive you to and from the hospital on the day of your surgery. Please confirm the visitor policy for the day of your procedure when you receive your phone call with an arrival time.     Call the surgeon's office with any new illnesses, exposures, or additional questions prior to surgery.    Please reference your “My Surgical Experience Booklet” for additional information to prepare for your upcoming surgery.

## 2024-11-01 ENCOUNTER — HOSPITAL ENCOUNTER (OUTPATIENT)
Facility: HOSPITAL | Age: 44
Setting detail: OUTPATIENT SURGERY
Discharge: HOME | End: 2024-11-01
Attending: SURGERY | Admitting: SURGERY
Payer: COMMERCIAL

## 2024-11-01 VITALS
SYSTOLIC BLOOD PRESSURE: 135 MMHG | DIASTOLIC BLOOD PRESSURE: 81 MMHG | TEMPERATURE: 97.2 F | HEIGHT: 71 IN | RESPIRATION RATE: 20 BRPM | WEIGHT: 154.76 LBS | HEART RATE: 86 BPM | BODY MASS INDEX: 21.67 KG/M2 | OXYGEN SATURATION: 100 %

## 2024-11-01 DIAGNOSIS — L21.9 SEBORRHEIC DERMATITIS: ICD-10-CM

## 2024-11-01 DIAGNOSIS — D17.6 BENIGN LIPOMATOUS NEOPLASM OF SPERMATIC CORD: Primary | ICD-10-CM

## 2024-11-01 DIAGNOSIS — K42.9 UMBILICAL HERNIA WITHOUT OBSTRUCTION AND WITHOUT GANGRENE: ICD-10-CM

## 2024-11-01 PROCEDURE — C1781 MESH (IMPLANTABLE): HCPCS | Performed by: SURGERY

## 2024-11-01 PROCEDURE — 49591 RPR AA HRN 1ST < 3 CM RDC: CPT | Performed by: PHYSICIAN ASSISTANT

## 2024-11-01 PROCEDURE — 49650 LAP ING HERNIA REPAIR INIT: CPT | Performed by: SURGERY

## 2024-11-01 PROCEDURE — C1727 CATH, BAL TIS DIS, NON-VAS: HCPCS | Performed by: SURGERY

## 2024-11-01 PROCEDURE — 49591 RPR AA HRN 1ST < 3 CM RDC: CPT | Performed by: SURGERY

## 2024-11-01 PROCEDURE — 49650 LAP ING HERNIA REPAIR INIT: CPT | Performed by: PHYSICIAN ASSISTANT

## 2024-11-01 PROCEDURE — NC001 PR NO CHARGE: Performed by: SURGERY

## 2024-11-01 DEVICE — 3DMAX MESH, 8.5 CM X 13.7 CM (3.3" X 5.4"), RIGHT, MEDIUM
Type: IMPLANTABLE DEVICE | Site: GROIN | Status: FUNCTIONAL
Brand: 3DMAX

## 2024-11-01 DEVICE — 3DMAX MESH, 8.5 CM X 13.7 CM (3.3" X 5.4"), LEFT, MEDIUM
Type: IMPLANTABLE DEVICE | Site: GROIN | Status: FUNCTIONAL
Brand: 3DMAX

## 2024-11-01 RX ORDER — DEXAMETHASONE SODIUM PHOSPHATE 10 MG/ML
INJECTION, SOLUTION INTRAMUSCULAR; INTRAVENOUS AS NEEDED
Status: DISCONTINUED | OUTPATIENT
Start: 2024-11-01 | End: 2024-11-01

## 2024-11-01 RX ORDER — OXYCODONE AND ACETAMINOPHEN 5; 325 MG/1; MG/1
1 TABLET ORAL ONCE
Status: COMPLETED | OUTPATIENT
Start: 2024-11-01 | End: 2024-11-01

## 2024-11-01 RX ORDER — KETOROLAC TROMETHAMINE 30 MG/ML
INJECTION, SOLUTION INTRAMUSCULAR; INTRAVENOUS AS NEEDED
Status: DISCONTINUED | OUTPATIENT
Start: 2024-11-01 | End: 2024-11-01

## 2024-11-01 RX ORDER — BUPIVACAINE HYDROCHLORIDE AND EPINEPHRINE 5; 5 MG/ML; UG/ML
INJECTION, SOLUTION PERINEURAL AS NEEDED
Status: DISCONTINUED | OUTPATIENT
Start: 2024-11-01 | End: 2024-11-01 | Stop reason: HOSPADM

## 2024-11-01 RX ORDER — OXYCODONE AND ACETAMINOPHEN 5; 325 MG/1; MG/1
1 TABLET ORAL EVERY 4 HOURS PRN
Qty: 10 TABLET | Refills: 0 | Status: SHIPPED | OUTPATIENT
Start: 2024-11-01

## 2024-11-01 RX ORDER — PROPOFOL 10 MG/ML
INJECTION, EMULSION INTRAVENOUS AS NEEDED
Status: DISCONTINUED | OUTPATIENT
Start: 2024-11-01 | End: 2024-11-01

## 2024-11-01 RX ORDER — FENTANYL CITRATE 50 UG/ML
INJECTION, SOLUTION INTRAMUSCULAR; INTRAVENOUS AS NEEDED
Status: DISCONTINUED | OUTPATIENT
Start: 2024-11-01 | End: 2024-11-01

## 2024-11-01 RX ORDER — ONDANSETRON 2 MG/ML
INJECTION INTRAMUSCULAR; INTRAVENOUS AS NEEDED
Status: DISCONTINUED | OUTPATIENT
Start: 2024-11-01 | End: 2024-11-01

## 2024-11-01 RX ORDER — HYDROMORPHONE HCL/PF 1 MG/ML
SYRINGE (ML) INJECTION AS NEEDED
Status: DISCONTINUED | OUTPATIENT
Start: 2024-11-01 | End: 2024-11-01

## 2024-11-01 RX ORDER — SODIUM CHLORIDE, SODIUM LACTATE, POTASSIUM CHLORIDE, CALCIUM CHLORIDE 600; 310; 30; 20 MG/100ML; MG/100ML; MG/100ML; MG/100ML
INJECTION, SOLUTION INTRAVENOUS CONTINUOUS PRN
Status: DISCONTINUED | OUTPATIENT
Start: 2024-11-01 | End: 2024-11-01

## 2024-11-01 RX ORDER — ROCURONIUM BROMIDE 10 MG/ML
INJECTION, SOLUTION INTRAVENOUS AS NEEDED
Status: DISCONTINUED | OUTPATIENT
Start: 2024-11-01 | End: 2024-11-01

## 2024-11-01 RX ORDER — CEFAZOLIN SODIUM 1 G/50ML
1000 SOLUTION INTRAVENOUS ONCE
Status: DISCONTINUED | OUTPATIENT
Start: 2024-11-01 | End: 2024-11-01 | Stop reason: HOSPADM

## 2024-11-01 RX ORDER — MAGNESIUM HYDROXIDE 1200 MG/15ML
LIQUID ORAL AS NEEDED
Status: DISCONTINUED | OUTPATIENT
Start: 2024-11-01 | End: 2024-11-01 | Stop reason: HOSPADM

## 2024-11-01 RX ORDER — CEFAZOLIN SODIUM 1 G/50ML
SOLUTION INTRAVENOUS AS NEEDED
Status: DISCONTINUED | OUTPATIENT
Start: 2024-11-01 | End: 2024-11-01

## 2024-11-01 RX ORDER — HYDROMORPHONE HCL/PF 1 MG/ML
0.5 SYRINGE (ML) INJECTION
Status: DISCONTINUED | OUTPATIENT
Start: 2024-11-01 | End: 2024-11-01 | Stop reason: HOSPADM

## 2024-11-01 RX ORDER — FENTANYL CITRATE/PF 50 MCG/ML
50 SYRINGE (ML) INJECTION
Status: DISCONTINUED | OUTPATIENT
Start: 2024-11-01 | End: 2024-11-01 | Stop reason: HOSPADM

## 2024-11-01 RX ORDER — SODIUM CHLORIDE, SODIUM LACTATE, POTASSIUM CHLORIDE, CALCIUM CHLORIDE 600; 310; 30; 20 MG/100ML; MG/100ML; MG/100ML; MG/100ML
125 INJECTION, SOLUTION INTRAVENOUS CONTINUOUS
Status: DISCONTINUED | OUTPATIENT
Start: 2024-11-01 | End: 2024-11-01 | Stop reason: HOSPADM

## 2024-11-01 RX ORDER — SODIUM CHLORIDE, SODIUM LACTATE, POTASSIUM CHLORIDE, CALCIUM CHLORIDE 600; 310; 30; 20 MG/100ML; MG/100ML; MG/100ML; MG/100ML
100 INJECTION, SOLUTION INTRAVENOUS CONTINUOUS
Status: DISCONTINUED | OUTPATIENT
Start: 2024-11-01 | End: 2024-11-01 | Stop reason: HOSPADM

## 2024-11-01 RX ORDER — ONDANSETRON 2 MG/ML
4 INJECTION INTRAMUSCULAR; INTRAVENOUS ONCE AS NEEDED
Status: DISCONTINUED | OUTPATIENT
Start: 2024-11-01 | End: 2024-11-01 | Stop reason: HOSPADM

## 2024-11-01 RX ADMIN — FENTANYL CITRATE 50 MCG: 50 INJECTION INTRAMUSCULAR; INTRAVENOUS at 09:27

## 2024-11-01 RX ADMIN — SUGAMMADEX 200 MG: 100 INJECTION, SOLUTION INTRAVENOUS at 09:00

## 2024-11-01 RX ADMIN — CEFAZOLIN SODIUM 1000 MG: 1 SOLUTION INTRAVENOUS at 07:29

## 2024-11-01 RX ADMIN — DEXAMETHASONE SODIUM PHOSPHATE 10 MG: 10 INJECTION, SOLUTION INTRAMUSCULAR; INTRAVENOUS at 07:44

## 2024-11-01 RX ADMIN — FENTANYL CITRATE 50 MCG: 50 INJECTION, SOLUTION INTRAMUSCULAR; INTRAVENOUS at 07:58

## 2024-11-01 RX ADMIN — KETOROLAC TROMETHAMINE 30 MG: 30 INJECTION, SOLUTION INTRAMUSCULAR at 08:52

## 2024-11-01 RX ADMIN — SODIUM CHLORIDE, SODIUM LACTATE, POTASSIUM CHLORIDE, AND CALCIUM CHLORIDE: .6; .31; .03; .02 INJECTION, SOLUTION INTRAVENOUS at 07:24

## 2024-11-01 RX ADMIN — SODIUM CHLORIDE, SODIUM LACTATE, POTASSIUM CHLORIDE, AND CALCIUM CHLORIDE: .6; .31; .03; .02 INJECTION, SOLUTION INTRAVENOUS at 08:48

## 2024-11-01 RX ADMIN — SODIUM CHLORIDE, SODIUM LACTATE, POTASSIUM CHLORIDE, AND CALCIUM CHLORIDE 125 ML/HR: .6; .31; .03; .02 INJECTION, SOLUTION INTRAVENOUS at 06:47

## 2024-11-01 RX ADMIN — ROCURONIUM BROMIDE 50 MG: 10 INJECTION, SOLUTION INTRAVENOUS at 07:33

## 2024-11-01 RX ADMIN — ONDANSETRON 4 MG: 2 INJECTION INTRAMUSCULAR; INTRAVENOUS at 07:44

## 2024-11-01 RX ADMIN — PROPOFOL 200 MG: 10 INJECTION, EMULSION INTRAVENOUS at 07:33

## 2024-11-01 RX ADMIN — ROCURONIUM BROMIDE 20 MG: 10 INJECTION, SOLUTION INTRAVENOUS at 08:19

## 2024-11-01 RX ADMIN — HYDROMORPHONE HYDROCHLORIDE 1 MG: 1 INJECTION, SOLUTION INTRAMUSCULAR; INTRAVENOUS; SUBCUTANEOUS at 09:02

## 2024-11-01 RX ADMIN — FENTANYL CITRATE 50 MCG: 50 INJECTION, SOLUTION INTRAMUSCULAR; INTRAVENOUS at 07:33

## 2024-11-01 RX ADMIN — FENTANYL CITRATE 50 MCG: 50 INJECTION INTRAMUSCULAR; INTRAVENOUS at 09:47

## 2024-11-01 RX ADMIN — OXYCODONE HYDROCHLORIDE AND ACETAMINOPHEN 1 TABLET: 5; 325 TABLET ORAL at 10:44

## 2024-11-01 NOTE — LETTER
[facility] OPERATING ROOM  [address] [place] [id number]  Dept: [phone]    November 1, 2024     Patient: Mayur Alba   YOB: 1980   Date of Visit: 11/1/2024       To Whom it May Concern:    Mayur Alba is under my professional care. He was seen in the hospital from 11/1/2024 to 11/01/24. He may return to work on 11/15/24 with the following limitations no heavy lifting more 10-15 pounds in the next 2 weeks .    If you have any questions or concerns, please don't hesitate to call.         Sincerely,          Carson Sarkar PA-C

## 2024-11-01 NOTE — DISCHARGE INSTR - AVS FIRST PAGE
1.  Keep incisions clean and dry for 2 days.  After 2 days, they may get wet in the shower.  No dressings are required.  2.  Take ibuprofen, 600 mg, 3 times a day regularly.  3.  Take Percocet, in addition to ibuprofen, if you need further pain control.   4.  Take the stool softener that you have at home daily.  5.  If you do not already have an appointment, call my office at [phone] for an appointment to be seen in about 10 to 14 days.

## 2024-11-01 NOTE — ANESTHESIA PREPROCEDURE EVALUATION
Procedure:  REPAIR HERNIA INGUINAL AND UMBILICAL,LAPAROSCOPIC (Bilateral: Groin)    Relevant Problems   ANESTHESIA (within normal limits)      CARDIO (within normal limits)      ENDO (within normal limits)      GYN (within normal limits)      PULMONARY (within normal limits)        Physical Exam    Airway    Mallampati score: II  TM Distance: >3 FB  Neck ROM: full     Dental    upper dentures    Cardiovascular  Rhythm: regular, Rate: normal    Pulmonary   Breath sounds clear to auscultation    Other Findings        Anesthesia Plan  ASA Score- 2     Anesthesia Type- general with ASA Monitors.         Additional Monitors:     Airway Plan: ETT.           Plan Factors-Exercise tolerance (METS): >4 METS.    Chart reviewed. EKG reviewed.  Existing labs reviewed. Patient summary reviewed.    Patient is not a current smoker.              Induction- intravenous.    Postoperative Plan- Plan for postoperative opioid use. Planned trial extubation        Informed Consent- Anesthetic plan and risks discussed with patient.  I personally reviewed this patient with the CRNA. Discussed and agreed on the Anesthesia Plan with the CRNA..

## 2024-11-01 NOTE — ANESTHESIA POSTPROCEDURE EVALUATION
Post-Op Assessment Note    CV Status:  Stable  Pain Score: 0    Pain management: adequate    Multimodal analgesia used between 6 hours prior to anesthesia start to PACU discharge    Mental Status:  Sleepy   Hydration Status:  Stable   PONV Controlled:  None   Airway Patency:  Patent  Two or more mitigation strategies used for obstructive sleep apnea   Post Op Vitals Reviewed: Yes    No anethesia notable event occurred.    Staff: CRNA           Last Filed PACU Vitals:  Vitals Value Taken Time   Temp 97.2    Pulse 91    BP 1667/92    Resp 16    SpO2 99        Modified Дмитрий:  No data recorded

## 2024-11-01 NOTE — NURSING NOTE
Patient returned from PACU alert and oriented times 4. Pt reports 5/10 pain. Pt reports no nausea. Beverage at bedside, call bell within reach, wife at bedside. Per pt, no questions or concerns at this time.

## 2024-11-01 NOTE — H&P
"[facility] History and Physical Note:    Assessment:  Small, less than 1 cm, fascial defect at the umbilicus.  Left greater than right groin bulges on exam and most likely reflecting small hernias.  Could also represent cord lipomas.  Patient understands that his groin pain complaint could persist following surgery.    Pertinent labs reviewed  Rewed CMP and CBC from 12 April 2024  Pertinent images and available reads personally reviewed  Reviewed CT images and report from April 2024  Pertinent notes reviewed  I reviewed the last PCP note by Dr. Berumen    Plan:  1.  Reviewed CMP and CBC from 12 April 2024.  As well as CT images and report.  2.  Plan laparoscopic (TEP) bilateral inguinal hernia repair.  We will also repair his small umbilical defect at that time.    Chief Complaint:  \"I am here for the hernias\"    HPI    Patient is a 43-year-old gentleman who recently saw his PCP, Dr. Berumen, for right groin pain and referred to my office for further evaluation.  He reports right groin discomfort that is intermittent over the last 6 months or so.  This discomfort is worse with certain exercises.  A CT abdomen and pelvis with renal stone protocol was previously performed in April 2024 and this revealed:  ABDOMINAL WALL/INGUINAL REGIONS: Small fat-containing umbilical hernia. Small fat-containing bilateral inguinal hernias, left larger than right.  BONES: No acute fracture or suspicious osseous lesion.  IMPRESSION:  5 mm left distal ureteral calculus with mild upstream hydronephroureterosis. No perinephric collection.  3 mm right renal nonobstructing calculus.     His left distal ureteral calculus has been treated by his urologist, Dr. Odonnell.      PMH:  Past Medical History:   Diagnosis Date    Cervical disc herniation     6/2/2014 per [employer]       PSH:  Past Surgical History:   Procedure Laterality Date    FL RETROGRADE PYELOGRAM  4/17/2024    HERNIA REPAIR      MO CYSTO/URETERO " "W/LITHOTRIPSY &INDWELL STENT INSRT Left 4/17/2024    Procedure: CYSTOSCOPY URETEROSCOPY WITH LITHOTRIPSY HOLMIUM LASER, RETROGRADE PYELOGRAM AND INSERTION STENT URETERAL;  Surgeon: Silas Odonnell MD;  Location: [facility];  Service: Urology       Home Meds:  Current Outpatient Medications on File Prior to Visit   Medication Sig Dispense Refill    clobetasol (TEMOVATE) 0.05 % external solution Apply topically 2 (two) times a day Apply topically to scalp ONLY twice a day AS NEEDED when flaring (Patient not taking: Reported on 6/12/2024) 50 mL 2    hydrocortisone (ANUSOL-HC) 2.5 % rectal cream Apply topically 2 (two) times a day 100 g 3    hydrocortisone (ANUSOL-HC) 25 mg suppository Insert 1 suppository (25 mg total) into the rectum 2 (two) times a day (Patient not taking: Reported on 6/12/2024) 12 suppository 0    ibuprofen (MOTRIN) 600 mg tablet Take 1 tablet (600 mg total) by mouth every 6 (six) hours as needed for mild pain (Patient not taking: Reported on 6/12/2024) 20 tablet 0    ketoconazole (NIZORAL) 2 % cream Apply topically daily to affected skin on the face when flaring, and 3-4 times per week for maintenance when under good control (Patient not taking: Reported on 6/12/2024) 60 g 5    ketoconazole (NIZORAL) 2 % shampoo Daily for 2 weeks straight and then on \"Mondays, Wednesdays and Fridays\":  Lather into scalp and skin on face, neck, chest, and back; leave on for 5 minutes and then rinse off completely. (Patient not taking: Reported on 6/12/2024) 100 mL 10    meloxicam (MOBIC) 7.5 mg tablet Take 7.5 mg by mouth daily      Misc Natural Products (FIBER 7 PO) Take by mouth (Patient not taking: Reported on 6/12/2024)      oxyCODONE (ROXICODONE) 5 immediate release tablet Take 1 tablet (5 mg total) by mouth every 6 (six) hours as needed for moderate pain Max Daily Amount: 20 mg (Patient not taking: Reported on 6/12/2024) 8 tablet 0    oxyCODONE (Roxicodone) 5 immediate release tablet Take 1 tablet (5 mg " total) by mouth every 4 (four) hours as needed for moderate pain for up to 6 doses Max Daily Amount: 30 mg (Patient not taking: Reported on 2024) 6 tablet 0    Probiotic Product (UP4 PROBIOTICS MENS PO) Take by mouth (Patient not taking: Reported on 2024)      tamsulosin (FLOMAX) 0.4 mg Take 1 capsule (0.4 mg total) by mouth daily with dinner Do not start before 2024. (Patient not taking: Reported on 2024) 10 capsule 0     No current facility-administered medications on file prior to visit.       Allergies:  Allergies   Allergen Reactions    Tilactase Other (See Comments) and GI Intolerance     Skin issues        Social Hx:  Social History     Socioeconomic History    Marital status: /Civil Union     Spouse name: Not on file    Number of children: Not on file    Years of education: Not on file    Highest education level: Not on file   Occupational History    Not on file   Tobacco Use    Smoking status: Former     Current packs/day: 0.00     Types: Cigarettes     Start date: 1993     Quit date: 2010     Years since quittin.5    Smokeless tobacco: Never   Vaping Use    Vaping status: Never Used   Substance and Sexual Activity    Alcohol use: No    Drug use: No    Sexual activity: Yes     Partners: Female   Other Topics Concern    Not on file   Social History Narrative    Not on file     Social Determinants of Health     Financial Resource Strain: Not on file   Food Insecurity: Not on file   Transportation Needs: Not on file   Physical Activity: Not on file   Stress: Not on file   Social Connections: Not on file   Intimate Partner Violence: Not on file   Housing Stability: Not on file        Family Hx:    Family History   Problem Relation Age of Onset    Hyperlipidemia Mother     Hypertension Mother     Atrial fibrillation Father     COPD Maternal Grandmother     Emphysema Maternal Grandmother     Uveitis Maternal Grandmother     Aortic aneurysm Maternal Grandmother     No  Known Problems Sister     No Known Problems Brother     No Known Problems Maternal Aunt     No Known Problems Maternal Uncle     No Known Problems Paternal Aunt     No Known Problems Paternal Uncle     No Known Problems Maternal Grandfather     No Known Problems Paternal Grandmother     No Known Problems Paternal Grandfather     ADD / ADHD Neg Hx     Anesthesia problems Neg Hx     Cancer Neg Hx     Clotting disorder Neg Hx     Collagen disease Neg Hx     Diabetes Neg Hx     Dislocations Neg Hx     Learning disabilities Neg Hx     Neurological problems Neg Hx     Osteoporosis Neg Hx     Rheumatologic disease Neg Hx     Scoliosis Neg Hx     Vascular Disease Neg Hx          Review of Systems   Constitutional:  Negative for chills and fever.   Respiratory: Negative.     Cardiovascular: Negative.    Gastrointestinal: Negative.    Genitourinary:         The HPI past medical history   Musculoskeletal: Negative.    Neurological: Negative.    Hematological: Negative.    All other systems reviewed and are negative.      There were no vitals taken for this visit.    Physical Exam  Vitals reviewed.   Constitutional:       General: He is not in acute distress.     Appearance: Normal appearance.   HENT:      Head: Normocephalic and atraumatic.   Cardiovascular:      Rate and Rhythm: Normal rate and regular rhythm.   Pulmonary:      Effort: Pulmonary effort is normal.      Breath sounds: Normal breath sounds.   Abdominal:      General: Abdomen is flat. There is no distension.      Palpations: Abdomen is soft.      Comments: Small umbilical defect, less than 1 cm.  Mild bulge to Valsalva left groin greater than right groin.  This could represent small fat-containing inguinal hernias versus cord lipomas.   Musculoskeletal:      Cervical back: Normal range of motion and neck supple.   Lymphadenopathy:      Cervical: No cervical adenopathy.   Skin:     General: Skin is warm and dry.   Neurological:      General: No focal deficit  present.      Mental Status: He is alert.         Pertinent labs reviewed  Rewed CMP and CBC from 12 April 2024  Pertinent images and available reads personally reviewed  Reviewed CT images and report from April 2024  Pertinent notes reviewed  I reviewed the last PCP note by Dr. Jamaica Christine MD FACS  [facility]  [phone]

## 2024-11-01 NOTE — OP NOTE
OPERATIVE REPORT  PATIENT NAME: Mayur Alba    :  1980  MRN: [id number]  Pt Location: [facility] OR ROOM 03    SURGERY DATE: 2024    Surgeons and Role:     * Harpreet Christine MD - Primary     * Carson Sarkar PA-C - Assisting    Preop Diagnosis:  Non-recurrent bilateral inguinal hernia without obstruction or gangrene [K40.20]  Umbilical hernia [K42.9]    Post-Op Diagnosis Codes:     * Non-recurrent bilateral inguinal hernia without obstruction or gangrene [K40.20]     * Umbilical hernia [K42.9]    Procedure(s):  Bilateral - BILATERAL LAPAROSCOPIC INGUINAL HERNIA REPAIR AND UMBILICAL HERNIA REPAIR    Specimen(s):  * No specimens in log *    Estimated Blood Loss:   Minimal    Drains:  Urethral Catheter 16 Fr. (Active)   Number of days: 0       Anesthesia Type:   General    Operative Indications:  Non-recurrent bilateral inguinal hernia without obstruction or gangrene [K40.20]  Umbilical hernia [K42.9]      Operative Findings:  1.  Bilateral cord lipomas  2.  Umbilical hernia with fascial defect 1 cm x 1 cm prior to opening.      Complications:   None    Procedure and Technique:  1.  Laparoscopic (TEP) bilateral inguinal hernia repair.  2.  Umbilical hernia repair    Patient is taken back to main operating, placed supine on the operating table, general anesthesia was induced, a Sawyer catheter was placed and the abdomen was prepped and draped in normal fashion.    Utilizing a curvilinear infraumbilical incision, skin was incised.  Soft tissue was divided with Bovie cautery.  The anterior rectus fascia was opened on the left and the Spacemaker plus balloon system was placed into the retrorectus, preperitoneal space.  The dissection balloon was inflated and a 5-minute pause was performed.    The dissection balloon was then removed and a pneumoperitoneum was created in the preperitoneal space and maintained at 15 mmHg throughout the case.  Two 5 mm trocars were placed in the low midline.    Utilize sterile  laparoscopic technique the left groin was dissected.  There was no indirect hernia sac protruding through the deep ring.  There was a cord lipoma which was reduced to its intra-abdominal location.  The left groin was then reinforced utilizing 3D max medium polypropylene mesh.  This was fixed with the pro tacker to Larry's ligament inferiorly, rectus muscles superior and medially, and 1 tack was placed in the tail of the mesh.    In a similar fashion, the right groin was dissected.  The peritoneum was scarred up to the deep ring due to his previous bilateral inguinal hernia repair as a child.  The peritoneum was dissected down and a moderate-sized cord lipoma was also reduced into the intra-abdominal cavity.  The right groin was also reinforced utilizing 3D max medium mesh.  This was fixed in a similar fashion to Larry's ligament inferior and medially, rectus muscles superior and medially, and 1 tack was placed in the tail of the mesh laterally.    Pneumoperitoneum was evacuated and the peritoneum was visualized as it came up in contact to the mesh.    Our attention was then turned to the umbilicus.  The dermis of the umbilicus was dissected free from a small hernia sac.  The 1 cm x 1 cm fascial defect was closed with a single, figure-of-eight, 0 Ethibond suture.  The area was irrigated and the dermis of the umbilicus was tacked down to the fascial repair.  Dermis was reapproximated to with 3-0 Vicryl.  4-0 Monocryl was used to approximate the skin edges.  4-0 Monocryl was also used to close the two 5 mm port sites.  Exofin was placed as a dressing.  At the end of the case, local anesthesia was used to create a field block around each incision.    The Sawyer catheter was removed, the patient woke from general anesthesia, extubated the operating room, and sent to the PACU in stable condition.    AMITA Sarkar was required for technical assistance   I was present for the entire procedure   A surgery resident was  not available    Patient Disposition:  PACU              SIGNATURE: Harpreet Christine MD  DATE: November 1, 2024  TIME: 8:55 AM

## 2024-11-01 NOTE — ANESTHESIA POSTPROCEDURE EVALUATION
Post-Op Assessment Note    CV Status:  Stable  Pain Score: 1    Pain management: adequate       Mental Status:  Alert and awake   Hydration Status:  Euvolemic and stable   PONV Controlled:  Controlled   Airway Patency:  Patent     Post Op Vitals Reviewed: Yes    No anethesia notable event occurred.    Staff: Anesthesiologist           Last Filed PACU Vitals:  Vitals Value Taken Time   Temp 97.2 °F (36.2 °C) 11/01/24 0914   Pulse 75 11/01/24 0945   /88 11/01/24 0945   Resp 18 11/01/24 0945   SpO2 99 % 11/01/24 0930       Modified Дмитрий:  Activity: 2 (11/1/2024  9:15 AM)  Respiration: 2 (11/1/2024  9:15 AM)  Circulation: 2 (11/1/2024  9:15 AM)  Consciousness: 2 (11/1/2024  9:15 AM)  Oxygen Saturation: 2 (11/1/2024  9:15 AM)  Modified Дмитрий Score: 10 (11/1/2024  9:15 AM)

## 2024-11-01 NOTE — NURSING NOTE
Pt D/C'd to home. DC instructions reviewed with pt and spouse; both expressed understanding. AMITA Byrd, at bedside to SW pt and spouse and provided work note. Per pt and spouse, no additional questions at this time. Advised to contact Dr. Christine's office with any concerns once home. Pt was able to independently ambulate to the bathroom and urinate.

## 2024-11-01 NOTE — INTERVAL H&P NOTE
H&P reviewed. After examining the patient I find no changes in the patients condition since the H&P had been written.    Vitals:    11/01/24 0627   BP: 128/81   Pulse: 65   Resp: 18   Temp: 98.3 °F (36.8 °C)   SpO2: 99%

## 2024-11-08 ENCOUNTER — NURSE TRIAGE (OUTPATIENT)
Age: 44
End: 2024-11-08

## 2024-11-08 NOTE — TELEPHONE ENCOUNTER
"PT called in. PT post-op day 7 Procedure: BILATERAL LAPAROSCOPIC INGUINAL HERNIA REPAIR AND UMBILICAL HERNIA REPAIR (Bilateral: Groin) with Dr. Christine.     PT is concerned that he is still having burning in his penis while urinating. At first PT thought it was r/t catheter, but it concerned that burning is still occurring. PT reports urine is clear and non-odorous. PT is drinking approx 2.5-3 L/day. PT offers no other complaints, denies incisional concerns, fevers/chills, bowel complaints.     Call back: [phone]    Reason for Disposition   MILD TO MODERATE post-op pain (e.g., pain scale 1-7) that is not controlled with pain medications    Answer Assessment - Initial Assessment Questions  1. SYMPTOM: \"What's the main symptom you're concerned about?\" (e.g., pain, fever, vomiting)      Penis pain  2. ONSET: \"When did pain  start?\"      Since surgery  3. SURGERY: \"What surgery did you have?\"      Procedure: BILATERAL LAPAROSCOPIC INGUINAL HERNIA REPAIR AND UMBILICAL HERNIA REPAIR (Bilateral: Groin)  4. DATE of SURGERY: \"When was the surgery?\"       11/1/24  5. ANESTHESIA: \"What type of anesthesia did you have?\" (e.g., general, spinal, epidural, local)      general  6. DRAINS: \"Were any drains place in or around the wound?\" (e.g., Hemovac, Jimbo-Keller, Penrose)      N/a  7. PAIN: \"Is there any pain?\" If Yes, ask: \"How bad is it?\"  (Scale 1-10; or mild, moderate, severe)      Yes, penis tip burning  8. FEVER: \"Do you have a fever?\" If Yes, ask: \"What is your temperature, how was it measured, and when did it start?\"      denies  9. VOMITING: \"Is there any vomiting?\" If Yes, ask: \"How many times?\"      denies  10. BLEEDING: \"Is there any bleeding?\" If Yes, ask: \"How much?\" and \"Where?\"        denies  11. OTHER SYMPTOMS: \"Do you have any other symptoms?\" (e.g., drainage from wound, painful urination, constipation)        denies    Protocols used: Post-Op Symptoms and Questions-Adult-[place]    "

## 2024-11-19 ENCOUNTER — OFFICE VISIT (OUTPATIENT)
Dept: SURGERY | Facility: CLINIC | Age: 44
End: 2024-11-19

## 2024-11-19 VITALS — TEMPERATURE: 96.9 F | HEIGHT: 71 IN | BODY MASS INDEX: 21.17 KG/M2 | WEIGHT: 151.2 LBS

## 2024-11-19 DIAGNOSIS — Z09 SURGERY FOLLOW-UP EXAMINATION: Primary | ICD-10-CM

## 2024-11-19 PROCEDURE — 99024 POSTOP FOLLOW-UP VISIT: CPT | Performed by: SURGERY

## 2024-11-19 NOTE — PROGRESS NOTES
Patient is status post laparoscopic TEP bilateral inguinal hernia repair 1 November 2024.  He is here for routine follow-up.  He reports minimal pain and no wound issues.    Incisions healing nicely.  No signs of infection.  Patient counseled.  Follow-up as needed.

## 2024-11-29 ENCOUNTER — HOSPITAL ENCOUNTER (OUTPATIENT)
Dept: RADIOLOGY | Facility: HOSPITAL | Age: 44
Discharge: HOME | End: 2024-11-29
Payer: COMMERCIAL

## 2024-11-29 ENCOUNTER — OFFICE VISIT (OUTPATIENT)
Dept: FAMILY MEDICINE CLINIC | Facility: CLINIC | Age: 44
End: 2024-11-29
Payer: COMMERCIAL

## 2024-11-29 VITALS
OXYGEN SATURATION: 98 % | SYSTOLIC BLOOD PRESSURE: 102 MMHG | TEMPERATURE: 98.6 F | DIASTOLIC BLOOD PRESSURE: 80 MMHG | BODY MASS INDEX: 21.92 KG/M2 | RESPIRATION RATE: 16 BRPM | WEIGHT: 156.6 LBS | HEIGHT: 71 IN | HEART RATE: 76 BPM

## 2024-11-29 DIAGNOSIS — Z00.00 ANNUAL PHYSICAL EXAM: Primary | ICD-10-CM

## 2024-11-29 DIAGNOSIS — N20.0 KIDNEY STONE: ICD-10-CM

## 2024-11-29 DIAGNOSIS — N20.1 LEFT URETERAL STONE: ICD-10-CM

## 2024-11-29 DIAGNOSIS — R30.0 DYSURIA: ICD-10-CM

## 2024-11-29 LAB
SL AMB  POCT GLUCOSE, UA: NORMAL
SL AMB LEUKOCYTE ESTERASE,UA: NORMAL
SL AMB POCT BILIRUBIN,UA: NORMAL
SL AMB POCT BLOOD,UA: NORMAL
SL AMB POCT CLARITY,UA: CLEAR
SL AMB POCT COLOR,UA: YELLOW
SL AMB POCT KETONES,UA: NORMAL
SL AMB POCT NITRITE,UA: NORMAL
SL AMB POCT PH,UA: 6
SL AMB POCT SPECIFIC GRAVITY,UA: 1.01
SL AMB POCT URINE PROTEIN: NORMAL
SL AMB POCT UROBILINOGEN: NORMAL

## 2024-11-29 PROCEDURE — 81003 URINALYSIS AUTO W/O SCOPE: CPT | Performed by: FAMILY MEDICINE

## 2024-11-29 PROCEDURE — 74018 RADEX ABDOMEN 1 VIEW: CPT

## 2024-11-29 PROCEDURE — 99396 PREV VISIT EST AGE 40-64: CPT | Performed by: FAMILY MEDICINE

## 2024-11-29 PROCEDURE — 99173 VISUAL ACUITY SCREEN: CPT | Performed by: FAMILY MEDICINE

## 2024-11-29 RX ORDER — MULTIVITAMIN WITH IRON
100 TABLET ORAL DAILY
Qty: 30 TABLET | Refills: 6 | Status: SHIPPED | OUTPATIENT
Start: 2024-11-29

## 2024-11-29 NOTE — PROGRESS NOTES
"Chief Complaint   Patient presents with    Physical Exam     Patient complains of burning when he urinates that comes and goes        Patient ID: Mayur Alba is a 43 y.o. male.    HPI  Pt is seeing for annual PE     The following portions of the patient's history were reviewed and updated as appropriate: allergies, current medications, past family history, past medical history, past social history, past surgical history and problem list.    Review of Systems   Constitutional:  Negative for fatigue, fever and unexpected weight change.   HENT:  Negative for congestion, ear discharge, ear pain, hearing loss, rhinorrhea, sinus pressure, sore throat and trouble swallowing.    Eyes: Negative.    Respiratory: Negative.     Cardiovascular: Negative.    Gastrointestinal: Negative.    Endocrine: Negative.    Genitourinary:  Positive for dysuria (on and off for a few years -  h/o kidney stones -  under urologist care  -  symptoms were worsening after recent cath during surgery).   Musculoskeletal: Negative.    Skin: Negative.    Neurological:  Negative for dizziness, weakness, light-headedness and numbness.   Hematological: Negative.    Psychiatric/Behavioral: Negative.         Current Outpatient Medications     No current facility-administered medications for this visit.       Objective:    /80 (BP Location: Left arm, Patient Position: Sitting, Cuff Size: Standard)   Pulse 76   Temp 98.6 °F (37 °C) (Temporal)   Resp 16   Ht 5' 11\" (1.803 m)   Wt 71 kg (156 lb 9.6 oz)   SpO2 98%   BMI 21.84 kg/m²        Physical Exam  Constitutional:       General: He is not in acute distress.     Appearance: Normal appearance. He is well-developed. He is not ill-appearing.   HENT:      Head: Normocephalic and atraumatic.      Right Ear: Hearing, tympanic membrane, ear canal and external ear normal.      Left Ear: Hearing, tympanic membrane, ear canal and external ear normal.      Nose: No congestion or rhinorrhea.      " Mouth/Throat:      Pharynx: No oropharyngeal exudate or posterior oropharyngeal erythema.   Eyes:      Extraocular Movements: Extraocular movements intact.      Conjunctiva/sclera: Conjunctivae normal.   Neck:      Thyroid: No thyroid mass or thyromegaly.      Vascular: No JVD.   Cardiovascular:      Rate and Rhythm: Normal rate and regular rhythm.      Heart sounds: Normal heart sounds. No murmur heard.     No gallop.   Pulmonary:      Effort: No respiratory distress.      Breath sounds: Normal breath sounds. No wheezing, rhonchi or rales.   Abdominal:      Palpations: Abdomen is soft.      Tenderness: There is no abdominal tenderness. There is no right CVA tenderness or left CVA tenderness.   Musculoskeletal:         General: No swelling or tenderness.      Cervical back: Normal range of motion and neck supple. No rigidity or tenderness.      Right lower leg: No edema.      Left lower leg: No edema.   Lymphadenopathy:      Cervical: No cervical adenopathy.   Skin:     Coloration: Skin is not pale.      Findings: No rash.   Neurological:      Mental Status: He is alert and oriented to person, place, and time.      Cranial Nerves: No cranial nerve deficit.      Motor: No weakness.      Gait: Gait normal.   Psychiatric:         Mood and Affect: Mood normal.         Behavior: Behavior normal.         Thought Content: Thought content normal.         Judgment: Judgment normal.           Labs in chart were reviewed.      Assessment/Plan:         Diagnoses and all orders for this visit:    Annual physical exam  -     CBC; Future  -     Comprehensive metabolic panel; Future  -     Lipid panel; Future  -     TSH, 3rd generation; Future  -     Hemoglobin A1C; Future  -     Vitamin D 25 hydroxy; Future  -     Vitamin B12; Future  -     CBC  -     Comprehensive metabolic panel  -     Lipid panel  -     TSH, 3rd generation  -     Hemoglobin A1C  -     Vitamin D 25 hydroxy  -     Vitamin B12    Dysuria  -     POCT urine dip auto  non-scope    Kidney stone  -     pyridoxine (VITAMIN B6) 100 mg tablet; Take 1 tablet (100 mg total) by mouth daily      F/u with urologist                       Jamaica Srivastava MD

## 2024-11-30 LAB
25(OH)D3+25(OH)D2 SERPL-MCNC: 24 NG/ML (ref 30–100)
ALBUMIN SERPL-MCNC: 4.6 G/DL (ref 4.1–5.1)
ALP SERPL-CCNC: 82 IU/L (ref 44–121)
ALT SERPL-CCNC: 16 IU/L (ref 0–44)
AST SERPL-CCNC: 13 IU/L (ref 0–40)
BILIRUB SERPL-MCNC: 0.5 MG/DL (ref 0–1.2)
BUN SERPL-MCNC: 10 MG/DL (ref 6–24)
BUN/CREAT SERPL: 13 (ref 9–20)
CALCIUM SERPL-MCNC: 9.4 MG/DL (ref 8.7–10.2)
CHLORIDE SERPL-SCNC: 105 MMOL/L (ref 96–106)
CHOLEST SERPL-MCNC: 149 MG/DL (ref 100–199)
CHOLEST/HDLC SERPL: 3.5 RATIO (ref 0–5)
CO2 SERPL-SCNC: 24 MMOL/L (ref 20–29)
CREAT SERPL-MCNC: 0.77 MG/DL (ref 0.76–1.27)
EGFR: 114 ML/MIN/1.73
ERYTHROCYTE [DISTWIDTH] IN BLOOD BY AUTOMATED COUNT: 11.6 % (ref 11.6–15.4)
EST. AVERAGE GLUCOSE BLD GHB EST-MCNC: 111 MG/DL
GLOBULIN SER-MCNC: 2 G/DL (ref 1.5–4.5)
GLUCOSE SERPL-MCNC: 96 MG/DL (ref 70–99)
HBA1C MFR BLD: 5.5 % (ref 4.8–5.6)
HCT VFR BLD AUTO: 41.8 % (ref 37.5–51)
HDLC SERPL-MCNC: 42 MG/DL
HGB BLD-MCNC: 14.1 G/DL (ref 13–17.7)
LDLC SERPL CALC-MCNC: 95 MG/DL (ref 0–99)
MCH RBC QN AUTO: 31.5 PG (ref 26.6–33)
MCHC RBC AUTO-ENTMCNC: 33.7 G/DL (ref 31.5–35.7)
MCV RBC AUTO: 94 FL (ref 79–97)
MICRODELETION SYND BLD/T FISH: NORMAL
PLATELET # BLD AUTO: 287 X10E3/UL (ref 150–450)
POTASSIUM SERPL-SCNC: 4.5 MMOL/L (ref 3.5–5.2)
PROT SERPL-MCNC: 6.6 G/DL (ref 6–8.5)
RBC # BLD AUTO: 4.47 X10E6/UL (ref 4.14–5.8)
SL AMB VLDL CHOLESTEROL CALC: 12 MG/DL (ref 5–40)
SODIUM SERPL-SCNC: 143 MMOL/L (ref 134–144)
TRIGL SERPL-MCNC: 58 MG/DL (ref 0–149)
TSH SERPL DL<=0.005 MIU/L-ACNC: 1.41 UIU/ML (ref 0.45–4.5)
VIT B12 SERPL-MCNC: 360 PG/ML (ref 232–1245)
WBC # BLD AUTO: 3.1 X10E3/UL (ref 3.4–10.8)

## 2024-12-02 ENCOUNTER — RESULTS FOLLOW-UP (OUTPATIENT)
Dept: UROLOGY | Facility: CLINIC | Age: 44
End: 2024-12-02

## 2024-12-04 ENCOUNTER — RESULTS FOLLOW-UP (OUTPATIENT)
Dept: FAMILY MEDICINE CLINIC | Facility: CLINIC | Age: 44
End: 2024-12-04

## 2024-12-04 NOTE — TELEPHONE ENCOUNTER
Patient advised. Says he had stopped taking vitamin D past couple months, after he was diagnosed with kidney stones, as he read that high levels of vitamin D can cause kidney stones. Please advise.

## 2024-12-04 NOTE — TELEPHONE ENCOUNTER
----- Message from Jamaica Srivastava MD sent at 12/4/2024  8:50 AM EST -----  Pl, advise pt-   stable labs -  still low white blood cell count ( pt was seen by hematologist 3 y ago for the same ) -  seems to be baseline - and chronically low Vit d - pt needs to double current Vit D dose OTC

## 2025-07-21 ENCOUNTER — OFFICE VISIT (OUTPATIENT)
Dept: FAMILY MEDICINE CLINIC | Facility: CLINIC | Age: 45
End: 2025-07-21
Payer: COMMERCIAL

## 2025-07-21 ENCOUNTER — TELEPHONE (OUTPATIENT)
Age: 45
End: 2025-07-21

## 2025-07-21 VITALS
OXYGEN SATURATION: 98 % | DIASTOLIC BLOOD PRESSURE: 90 MMHG | RESPIRATION RATE: 18 BRPM | BODY MASS INDEX: 22.6 KG/M2 | HEART RATE: 74 BPM | TEMPERATURE: 98.1 F | WEIGHT: 161.4 LBS | HEIGHT: 71 IN | SYSTOLIC BLOOD PRESSURE: 130 MMHG

## 2025-07-21 DIAGNOSIS — L73.9 FOLLICULITIS: Primary | ICD-10-CM

## 2025-07-21 DIAGNOSIS — L50.9 URTICARIA: ICD-10-CM

## 2025-07-21 PROCEDURE — 99214 OFFICE O/P EST MOD 30 MIN: CPT | Performed by: FAMILY MEDICINE

## 2025-07-21 RX ORDER — PREDNISONE 20 MG/1
40 TABLET ORAL DAILY
Qty: 10 TABLET | Refills: 0 | Status: SHIPPED | OUTPATIENT
Start: 2025-07-21 | End: 2025-07-26

## 2025-07-21 RX ORDER — SULFAMETHOXAZOLE AND TRIMETHOPRIM 800; 160 MG/1; MG/1
1 TABLET ORAL 2 TIMES DAILY
Qty: 14 TABLET | Refills: 0 | Status: SHIPPED | OUTPATIENT
Start: 2025-07-21 | End: 2025-07-28

## 2025-07-21 RX ORDER — SILVER SULFADIAZINE 10 MG/G
CREAM TOPICAL 2 TIMES DAILY
Qty: 50 G | Refills: 0 | Status: SHIPPED | OUTPATIENT
Start: 2025-07-21

## 2025-07-21 NOTE — TELEPHONE ENCOUNTER
Chandan is scheduled for an appt tomorrow @ 3PM. He was stung by bee on Saturday. He is not sure if his symptoms have anything do with the sting but he has about 5-6 boils on his body that he state looks pustious - one is on his belly button that his is concerned about that looks infected on his insicion site by where he had his hernia surgery.    Patient requesting to see if anyone would be willing to see him this afternoon. Attempt made to reach office.     Please advise and notify patient. Thank you!

## 2025-07-21 NOTE — PROGRESS NOTES
"Name: Mayur Alba      : 1980      MRN: [id number]  Encounter Provider: [place] Srivastava MD  Encounter Date: 2025   Encounter department: [facility]    Assessment & Plan  Folliculitis    Orders:  •  sulfamethoxazole-trimethoprim (BACTRIM DS) 800-160 mg per tablet; Take 1 tablet by mouth in the morning and 1 tablet before bedtime. Do all this for 7 days.  •  silver sulfadiazine (SILVADENE,SSD) 1 % cream; Apply topically 2 (two) times a day    Urticaria    Orders:  •  predniSONE 20 mg tablet; Take 2 tablets (40 mg total) by mouth daily for 5 days         History of Present Illness     Pt is seeing for several itchy lumps on legs and abd started yesterday and worsening, no therapy tried, worse one around umbilicus - draining  -  recalled a bee sting 2 days ago at the occipital scalp -  no local reaction -  was hiking last weekend       Review of Systems   Constitutional: Negative.    Respiratory: Negative.     Cardiovascular: Negative.    Gastrointestinal: Negative.    Genitourinary: Negative.    Musculoskeletal: Negative.    Neurological: Negative.      Past Medical History[1]  Past Surgical History[1]  Family History[1]  Social History[1]  Medications[1]  Allergies   Allergen Reactions   • Pollen Extract Allergic Rhinitis   • Tilactase Other (See Comments) and GI Intolerance     Skin issues      Immunization History   Administered Date(s) Administered   • COVID-19 MODERNA VACC 0.5 ML IM 2021, 2021   • Tdap 2013   • Tuberculin Skin Test-PPD Intradermal 2015     Objective   /90 (BP Location: Left arm, Patient Position: Sitting, Cuff Size: Standard)   Pulse 74   Temp 98.1 °F (36.7 °C) (Temporal)   Resp 18   Ht 5' 11\" (1.803 m)   Wt 73.2 kg (161 lb 6.4 oz)   SpO2 98%   BMI 22.51 kg/m²     Physical Exam  Constitutional:       General: He is not in acute distress.     Appearance: He is not ill-appearing.     Cardiovascular:      Rate and Rhythm: " Normal rate.   Pulmonary:      Effort: Pulmonary effort is normal. No respiratory distress.     Skin:     Findings: Rash (several urticaria lesions both arms and abd -  one at umbilicus is oozing clear fluid) present.     Neurological:      Mental Status: He is alert.                [1]  Past Medical History:  Diagnosis Date   • Cervical disc herniation     6/2/2014   • Hernia of abdominal wall     umbilical, inguinal   • Inflammatory bowel disease    • Kidney stone    • Snores    • Wears glasses    • Wears partial dentures     upper-2 front teeth   [1]  Past Surgical History:  Procedure Laterality Date   • COLONOSCOPY  2015   • FL RETROGRADE PYELOGRAM  04/17/2024   • HERNIA REPAIR      age 6 and age 8-bilateral inguinal   • IL CYSTO/URETERO W/LITHOTRIPSY &INDWELL STENT INSRT Left 04/17/2024    Procedure: CYSTOSCOPY URETEROSCOPY WITH LITHOTRIPSY HOLMIUM LASER, RETROGRADE PYELOGRAM AND INSERTION STENT URETERAL;  Surgeon: Silas Odonnell MD;  Location: [facility];  Service: Urology   • IL LAPAROSCOPY SURG RPR INITIAL INGUINAL HERNIA Bilateral 11/1/2024    Procedure: BILATERAL LAPAROSCOPIC INGUINAL HERNIA REPAIR AND UMBILICAL HERNIA REPAIR;  Surgeon: Harpreet Christine MD;  Location: [facility];  Service: General   [1]  Family History  Problem Relation Name Age of Onset   • Hyperlipidemia Mother Emy    • Hypertension Mother Emy    • Atrial fibrillation Father     • COPD Maternal Grandmother Madhavi    • Emphysema Maternal Grandmother Madhavi    • Uveitis Maternal Grandmother Madhavi    • Aortic aneurysm Maternal Grandmother Madhavi    • No Known Problems Sister     • No Known Problems Brother     • Crohn's disease Maternal Aunt Caty    • No Known Problems Maternal Uncle     • No Known Problems Paternal Aunt     • No Known Problems Paternal Uncle     • Colon cancer Maternal Grandfather Maternal great grandfather    • No Known Problems Paternal Grandmother     • No Known Problems Paternal Grandfather     • ADD / ADHD Neg Hx      • Anesthesia problems Neg Hx     • Cancer Neg Hx     • Clotting disorder Neg Hx     • Collagen disease Neg Hx     • Diabetes Neg Hx     • Dislocations Neg Hx     • Learning disabilities Neg Hx     • Neurological problems Neg Hx     • Osteoporosis Neg Hx     • Rheumatologic disease Neg Hx     • Scoliosis Neg Hx     • Vascular Disease Neg Hx     [1]  Social History  Tobacco Use   • Smoking status: Former     Current packs/day: 0.00     Types: Cigarettes     Start date: 1993     Quit date: 2010     Years since quittin.6     Passive exposure: Past   • Smokeless tobacco: Never   Vaping Use   • Vaping status: Never Used   Substance and Sexual Activity   • Alcohol use: Yes     Comment: seldom   • Drug use: No   • Sexual activity: Yes     Partners: Female   [1]  Current Outpatient Medications on File Prior to Visit   Medication Sig   • Multiple Vitamins-Minerals (MULTIVITAMIN ADULTS PO) Take by mouth   • Omega-3 Fatty Acids (OMEGA 3 500 PO) Take by mouth   • ketoconazole (NIZORAL) 2 % cream Apply topically daily to affected skin on the face when flaring, and 3-4 times per week for maintenance when under good control (Patient not taking: Reported on 2025)   • meloxicam (MOBIC) 7.5 mg tablet Take 7.5 mg by mouth every evening (Patient not taking: Reported on 2025)   • Misc Natural Products (FIBER 7 PO) Take by mouth (Patient not taking: Reported on 2024)   • pyridoxine (VITAMIN B6) 100 mg tablet Take 1 tablet (100 mg total) by mouth daily (Patient not taking: Reported on 2025)

## (undated) DEVICE — CYSTO TUBING TUR Y IRRIGATION

## (undated) DEVICE — RADIOLOGY STERILE LABELS: Brand: CENTURION

## (undated) DEVICE — INTENDED FOR TISSUE SEPARATION, AND OTHER PROCEDURES THAT REQUIRE A SHARP SURGICAL BLADE TO PUNCTURE OR CUT.: Brand: BARD-PARKER SAFETY BLADES SIZE 11, STERILE

## (undated) DEVICE — INSUFFLATION NEEDLE TO ESTABLISH PNEUMOPERITONEUM.: Brand: INSUFFLATION NEEDLE

## (undated) DEVICE — URETERAL CATH 5 FR

## (undated) DEVICE — MICRO HVTSA, 0.5G AND HVTSA SOURCEMARK PRODUCT CODE M1206 AND M1206-01: Brand: EXOFIN MICRO HVTSA, 0.5G

## (undated) DEVICE — GLOVE SRG BIOGEL 7.5

## (undated) DEVICE — ADHESIVE SKIN HIGH VISCOSITY EXOFIN 1ML

## (undated) DEVICE — SUT VICRYL 0 UR-6 27 IN J603H

## (undated) DEVICE — LUBRICANT JELLY SURGILUBE TUBE 4OZ FLIP TOP

## (undated) DEVICE — CYSTOSCOPY PACK: Brand: CONVERTORS

## (undated) DEVICE — GLOVE INDICATOR PI UNDERGLOVE SZ 7.5 BLUE

## (undated) DEVICE — PACK GENERAL LF

## (undated) DEVICE — POUCH URO CATCHER II STERILE

## (undated) DEVICE — SYRINGE 10ML LL CONTROL TOP

## (undated) DEVICE — TUBING SMOKE EVAC W/FILTRATION DEVICE PLUMEPORT ACTIV

## (undated) DEVICE — FIXATION DEVICE: Brand: PROTACK

## (undated) DEVICE — DRAPE UTILITY

## (undated) DEVICE — GLOVE SRG BIOGEL 8

## (undated) DEVICE — ACCESS AND DISSECTOR SYSTEM: Brand: SPACEMAKER

## (undated) DEVICE — SUT MONOCRYL 4-0 PS-2 18 IN Y496G

## (undated) DEVICE — FIBER STD QUANTA 272 MICRON

## (undated) DEVICE — TOWEL SURG XR DETECT GREEN STRL RFD

## (undated) DEVICE — NEPTUNE E-SEP SMOKE EVACUATION PENCIL, COATED, 70MM BLADE, PUSH BUTTON SWITCH: Brand: NEPTUNE E-SEP

## (undated) DEVICE — TOWEL SET X-RAY

## (undated) DEVICE — SEAL BIOPSY PORT ADJUST F/ACCESSORIES UP TO 3FR

## (undated) DEVICE — Device: Brand: OMNICLOSE TROCAR SITE CLOSURE DEVICE

## (undated) DEVICE — SPECIMEN CONTAINER: Brand: CARDINAL HEALTH

## (undated) DEVICE — FABRIC REINFORCED, SURGICAL GOWN, XL: Brand: CONVERTORS

## (undated) DEVICE — STERILE DOUBLE BASIN SET PACK: Brand: CARDINAL HEALTH

## (undated) DEVICE — ADHESIVE SKN CLSR HISTOACRYL FLEX 0.5ML LF

## (undated) DEVICE — [HIGH FLOW INSUFFLATOR,  DO NOT USE IF PACKAGE IS DAMAGED,  KEEP DRY,  KEEP AWAY FROM SUNLIGHT,  PROTECT FROM HEAT AND RADIOACTIVE SOURCES.]: Brand: PNEUMOSURE

## (undated) DEVICE — TRAY FOLEY 16FR URIMETER SURESTEP

## (undated) DEVICE — TROCAR: Brand: KII FIOS FIRST ENTRY

## (undated) DEVICE — ASTOUND STANDARD SURGICAL GOWN, XL: Brand: CONVERTORS

## (undated) DEVICE — STR GUIDEWIRE BOWL WITH LID PK: Brand: CARDINAL HEALTH

## (undated) DEVICE — GUIDEWIRE STRGHT TIP 0.038 IN SOLO PLUS

## (undated) DEVICE — CHLORAPREP HI-LITE 26ML ORANGE